# Patient Record
Sex: FEMALE | Race: WHITE
[De-identification: names, ages, dates, MRNs, and addresses within clinical notes are randomized per-mention and may not be internally consistent; named-entity substitution may affect disease eponyms.]

---

## 2018-04-07 ENCOUNTER — HOSPITAL ENCOUNTER (INPATIENT)
Dept: HOSPITAL 97 - ER | Age: 73
LOS: 7 days | Discharge: HOME HEALTH | DRG: 917 | End: 2018-04-14
Attending: FAMILY MEDICINE | Admitting: INTERNAL MEDICINE
Payer: COMMERCIAL

## 2018-04-07 VITALS — BODY MASS INDEX: 28.7 KG/M2

## 2018-04-07 DIAGNOSIS — G47.33: ICD-10-CM

## 2018-04-07 DIAGNOSIS — N39.0: ICD-10-CM

## 2018-04-07 DIAGNOSIS — J96.00: ICD-10-CM

## 2018-04-07 DIAGNOSIS — M54.9: ICD-10-CM

## 2018-04-07 DIAGNOSIS — G92: ICD-10-CM

## 2018-04-07 DIAGNOSIS — F01.50: ICD-10-CM

## 2018-04-07 DIAGNOSIS — G89.4: ICD-10-CM

## 2018-04-07 DIAGNOSIS — R00.0: ICD-10-CM

## 2018-04-07 DIAGNOSIS — E87.1: ICD-10-CM

## 2018-04-07 DIAGNOSIS — J18.9: ICD-10-CM

## 2018-04-07 DIAGNOSIS — I10: ICD-10-CM

## 2018-04-07 DIAGNOSIS — E66.9: ICD-10-CM

## 2018-04-07 DIAGNOSIS — T46.6X1A: ICD-10-CM

## 2018-04-07 DIAGNOSIS — T39.391A: Primary | ICD-10-CM

## 2018-04-07 DIAGNOSIS — I48.2: ICD-10-CM

## 2018-04-07 DIAGNOSIS — R57.9: ICD-10-CM

## 2018-04-07 DIAGNOSIS — E78.5: ICD-10-CM

## 2018-04-07 DIAGNOSIS — F17.200: ICD-10-CM

## 2018-04-07 DIAGNOSIS — E03.9: ICD-10-CM

## 2018-04-07 LAB
ALBUMIN SERPL BCP-MCNC: 3.9 G/DL (ref 3.2–5.5)
ALP SERPL-CCNC: 89 IU/L (ref 42–121)
ALT SERPL W P-5'-P-CCNC: 20 IU/L (ref 10–60)
AST SERPL W P-5'-P-CCNC: 23 IU/L (ref 10–42)
BUN BLD-MCNC: 21 MG/DL (ref 6–20)
CKMB CREATINE KINASE MB: 3.4 NG/ML (ref 0.3–4)
COHGB MFR BLDA: 3.1 % (ref 0–1.5)
GLUCOSE SERPLBLD-MCNC: 128 MG/DL (ref 65–120)
HCT VFR BLD CALC: 39.3 % (ref 36–45)
INR BLD: 0.97
LYMPHOCYTES # SPEC AUTO: 2.3 K/UL (ref 0.7–4.9)
MAGNESIUM SERPL-MCNC: 2 MG/DL (ref 1.8–2.5)
MCH RBC QN AUTO: 28.3 PG (ref 27–35)
MCV RBC: 86.6 FL (ref 80–100)
METHAMPHET UR QL SCN: NEGATIVE
OXYHGB MFR BLDA: 95.5 % (ref 94–97)
PMV BLD: 7.7 FL (ref 7.6–11.3)
POTASSIUM SERPL-SCNC: 4.2 MEQ/L (ref 3.6–5)
RBC # BLD: 4.54 M/UL (ref 3.86–4.86)
SAO2 % BLDA: 99.3 % (ref 92–98.5)
THC SERPL-MCNC: NEGATIVE NG/ML
TSH SERPL DL<=0.05 MIU/L-ACNC: 7.46 UIU/ML (ref 0.34–5.6)
UA COMPLETE W REFLEX CULTURE PNL UR: (no result)

## 2018-04-07 PROCEDURE — 83605 ASSAY OF LACTIC ACID: CPT

## 2018-04-07 PROCEDURE — 82533 TOTAL CORTISOL: CPT

## 2018-04-07 PROCEDURE — 99291 CRITICAL CARE FIRST HOUR: CPT

## 2018-04-07 PROCEDURE — 82550 ASSAY OF CK (CPK): CPT

## 2018-04-07 PROCEDURE — 0T9B70Z DRAINAGE OF BLADDER WITH DRAINAGE DEVICE, VIA NATURAL OR ARTIFICIAL OPENING: ICD-10-PCS

## 2018-04-07 PROCEDURE — 87205 SMEAR GRAM STAIN: CPT

## 2018-04-07 PROCEDURE — 84100 ASSAY OF PHOSPHORUS: CPT

## 2018-04-07 PROCEDURE — 95816 EEG AWAKE AND DROWSY: CPT

## 2018-04-07 PROCEDURE — 83880 ASSAY OF NATRIURETIC PEPTIDE: CPT

## 2018-04-07 PROCEDURE — 96365 THER/PROPH/DIAG IV INF INIT: CPT

## 2018-04-07 PROCEDURE — 36415 COLL VENOUS BLD VENIPUNCTURE: CPT

## 2018-04-07 PROCEDURE — 80307 DRUG TEST PRSMV CHEM ANLYZR: CPT

## 2018-04-07 PROCEDURE — 85610 PROTHROMBIN TIME: CPT

## 2018-04-07 PROCEDURE — 51702 INSERT TEMP BLADDER CATH: CPT

## 2018-04-07 PROCEDURE — 70551 MRI BRAIN STEM W/O DYE: CPT

## 2018-04-07 PROCEDURE — 85730 THROMBOPLASTIN TIME PARTIAL: CPT

## 2018-04-07 PROCEDURE — 74018 RADEX ABDOMEN 1 VIEW: CPT

## 2018-04-07 PROCEDURE — 80076 HEPATIC FUNCTION PANEL: CPT

## 2018-04-07 PROCEDURE — 0BH17EZ INSERTION OF ENDOTRACHEAL AIRWAY INTO TRACHEA, VIA NATURAL OR ARTIFICIAL OPENING: ICD-10-PCS

## 2018-04-07 PROCEDURE — 82553 CREATINE MB FRACTION: CPT

## 2018-04-07 PROCEDURE — 80329 ANALGESICS NON-OPIOID 1 OR 2: CPT

## 2018-04-07 PROCEDURE — 87088 URINE BACTERIA CULTURE: CPT

## 2018-04-07 PROCEDURE — 70450 CT HEAD/BRAIN W/O DYE: CPT

## 2018-04-07 PROCEDURE — 94002 VENT MGMT INPAT INIT DAY: CPT

## 2018-04-07 PROCEDURE — 84145 PROCALCITONIN (PCT): CPT

## 2018-04-07 PROCEDURE — 85025 COMPLETE CBC W/AUTO DIFF WBC: CPT

## 2018-04-07 PROCEDURE — 71045 X-RAY EXAM CHEST 1 VIEW: CPT

## 2018-04-07 PROCEDURE — 84439 ASSAY OF FREE THYROXINE: CPT

## 2018-04-07 PROCEDURE — 87040 BLOOD CULTURE FOR BACTERIA: CPT

## 2018-04-07 PROCEDURE — 80053 COMPREHEN METABOLIC PANEL: CPT

## 2018-04-07 PROCEDURE — 87086 URINE CULTURE/COLONY COUNT: CPT

## 2018-04-07 PROCEDURE — 84132 ASSAY OF SERUM POTASSIUM: CPT

## 2018-04-07 PROCEDURE — 83735 ASSAY OF MAGNESIUM: CPT

## 2018-04-07 PROCEDURE — 84443 ASSAY THYROID STIM HORMONE: CPT

## 2018-04-07 PROCEDURE — 70544 MR ANGIOGRAPHY HEAD W/O DYE: CPT

## 2018-04-07 PROCEDURE — 82962 GLUCOSE BLOOD TEST: CPT

## 2018-04-07 PROCEDURE — 81015 MICROSCOPIC EXAM OF URINE: CPT

## 2018-04-07 PROCEDURE — 84484 ASSAY OF TROPONIN QUANT: CPT

## 2018-04-07 PROCEDURE — 96375 TX/PRO/DX INJ NEW DRUG ADDON: CPT

## 2018-04-07 PROCEDURE — 80048 BASIC METABOLIC PNL TOTAL CA: CPT

## 2018-04-07 PROCEDURE — 81003 URINALYSIS AUTO W/O SCOPE: CPT

## 2018-04-07 PROCEDURE — 71046 X-RAY EXAM CHEST 2 VIEWS: CPT

## 2018-04-07 PROCEDURE — 82805 BLOOD GASES W/O2 SATURATION: CPT

## 2018-04-07 PROCEDURE — 31500 INSERT EMERGENCY AIRWAY: CPT

## 2018-04-07 PROCEDURE — 97163 PT EVAL HIGH COMPLEX 45 MIN: CPT

## 2018-04-07 PROCEDURE — 93005 ELECTROCARDIOGRAM TRACING: CPT

## 2018-04-07 PROCEDURE — 5A1945Z RESPIRATORY VENTILATION, 24-96 CONSECUTIVE HOURS: ICD-10-PCS

## 2018-04-07 PROCEDURE — 94003 VENT MGMT INPAT SUBQ DAY: CPT

## 2018-04-07 RX ADMIN — SODIUM CHLORIDE SCH: 0.9 INJECTION, SOLUTION INTRAVENOUS at 23:14

## 2018-04-07 NOTE — RAD REPORT
EXAM DESCRIPTION:  CT - Head Brain Wo Cont - 4/7/2018 9:05 pm

 

CLINICAL HISTORY:  Altered consciousness.

 

COMPARISON:  04/12/2017

 

TECHNIQUE:  All CT scans are performed using dose optimization technique as appropriate and may inclu
de automated exposure control or mA/KV adjustment according to patient size.

 

FINDINGS:  No intracranial hemorrhage, hydrocephalus or extra-axial fluid collection.Mild generalized
 brain atrophy is present with mild periventricular and deep white matter chronic microvascular ische
john changes.No areas of brain edema or evidence of midline shift.

 

The paranasal sinuses and mastoids are clear. The calvarium is intact.

 

IMPRESSION:  No acute intracranial abnormality.

## 2018-04-07 NOTE — RAD REPORT
EXAM DESCRIPTION:  RAD - Chest Single View - 4/7/2018 8:33 pm

 

CLINICAL HISTORY:  Respiratory failure.

 

COMPARISON:  None.

 

FINDINGS:  Portable technique limits examination quality.

 

The lungs are grossly clear. The tip of the ET tube is above the cy. The heart is normal in size.
 No displaced fractures.

 

IMPRESSION:  Endotracheal intubation.

## 2018-04-07 NOTE — ER
Nurse's Notes                                                                                     

 [facility]                                                                

Name: Olga Mratinez                                                                              

Age: 72 yrs                                                                                       

Sex: Female                                                                                       

: 1945                                                                                   

MRN: [id number]                                                                                   

Arrival Date: 2018                                                                          

Time: 20:07                                                                                       

Account#: [id number]                                                                            

Bed 2                                                                                             

Private MD:                                                                                       

Diagnosis: Altered mental status, unspecified                                                     

                                                                                                  

Presentation:                                                                                     

                                                                                             

20:14 Presenting complaint: EMS states: pt was found sitting in chair slumped over in garage  kl  

      pill fragments noted mouth pt obtunded non responsive agonal respirations. Transition       

      of care: patient was not received from another setting of care. Onset of symptoms was       

      2018. Note PT ARRIVED AT . Care prior to arrival: IV TO RIGHT AC.             

20:14 Method Of Arrival: EMS                                                                    

20:14 Acuity: DIMA 1                                                                             

20:21 Note  % O2 PULSE 88 ETOMIDATE 20MG IV P  120MG SUCCH  PT INTUBATED   kl  

      WITH 7.5 ETT AT % O2 SAT.                                                            

21:10 Note spoke with patient spouse reports at 2 pm spouse appeared confused and slurred     la1 

      speech he said he asked her what she took she responded she took a dilaudid 4mg pill        

      for her back pain he then went into computer room and found her in his garage at around     

      7:00 pm Asked if he thought she would intentionally hurt herself he responded               

      :"absolutely not".                                                                          

                                                                                                  

Triage Assessment:                                                                                

20:05 General: Appears in no apparent distress. obese, well nourished, Behavior is            kl  

      unresponsive. Pain: Unable to use pain scale. Patient is unresponsive. EENT: No             

      deficits noted. Neuro: Level of Consciousness is obtunded, Oriented to none.                

      Cardiovascular: No deficits noted. Respiratory: Respiratory effort is Respiratory           

      pattern is agonal. GI: No deficits noted. : No deficits noted. Derm: No deficits          

      noted.                                                                                      

                                                                                                  

Historical:                                                                                       

- Allergies:                                                                                      

20:25 Sulfa (Sulfonamide Antibiotics);                                                        kl  

- Home Meds:                                                                                      

21:28 alprazolam 0.5 mg Oral Tb24 1 tab once daily [Active]; atorvastatin 40 mg oral tab 1    la1 

      tab once daily [Active]; omeprazole 20 mg Oral TbEC 20 mg [Active]; Flonase 50              

      mcg/actuation Nasal spsn [Active]; levothyroxine 25 mcg tab once daily [Active];            

      diclofenac sodium 75 mg oral TbEC 1 tab 2 times per day [Active]; Vitamin D Oral 2000       

      unit twice a day [Active]; baclofen 10 mg Oral tab 1 tab twice daily [Active]; Dilaudid     

      4 mg oral tab [Active]; amiodarone 200 mg Oral tab once daily [Active]; Eliquis 5 mg        

      oral tab 2 times per day [Active]; Bupivicaine pain pump [Active];                          

- PMHx:                                                                                           

21:30 cardiiac arrythmia; High Cholesterol; Chronic pain; Dementia; Hypothyroidism;           la1 

                                                                                                  

- Immunization history:: Adult Immunizations unknown.                                             

- Social history:: Smoking status: unknown.                                                       

- Family history:: not pertinent.                                                                 

                                                                                                  

                                                                                                  

Screenin:15 Abuse screen: Denies injuries from another. Nutritional screening: No deficits noted.   la1 

      Tuberculosis screening: No symptoms or risk factors identified. Fall Risk None              

      identified.                                                                                 

                                                                                                  

Assessment:                                                                                       

20:20 Reassessment: Pt has a white powdery substance around lips and mouth. General: Appears  la1 

      obese, Behavior is unresponsive. Neuro: Level of Consciousness is unresponsive. Neuro:      

      Pupils are PERRLA. Cardiovascular: Heart tones S1 S2 present Capillary refill < 3           

      seconds Patient's skin is warm and dry. Rhythm is sinus bradycardia. Respiratory:           

      Airway via oral intubation Trachea midline Breath sounds are clear bilaterally. GI:         

      Abdomen is obese, Bowel sounds present X 4 quads. Abd is soft X 4 quads. : No signs       

      and/or symptoms were reported regarding the genitourinary system.                           

21:45 Reassessment: Patient appears in no apparent distress at this time. No changes from     la1 

      previously documented assessment. pt intubated, tolerating tube. titrating propofol as      

      ordered. Biar hugger initiated as pt was hypothermic. Pt resting in stretcher,       

      at bedside.                                                                                 

22:00 Reassessment: Patient appears in no apparent distress at this time. No changes from     aa1 

      previously documented assessment. Report received from Matthias Mora RN. Pt awaiting ICU      

      bed. Dr. Serrato at bedside conversing with .                                       

23:10 Reassessment: Report given to BENIGNO Bradley in ICU.                                       aa1 

                                                                                                  

Vital Signs:                                                                                      

20:14  / 75; Pulse 81; Resp 16; Pulse Ox 100% ;                                         kl  

21:00 Weight 100 kg (R);                                                                      la1 

21:22  / 82; Pulse 47; Resp 14; Temp 93.2(R); Pulse Ox 100% on ETT vent;                la1 

21:44 BP 89 / 50; Pulse 47; Resp 15; Pulse Ox 100% on ETT vent;                               la1 

22:07  / 56; Pulse 47; Resp 18; Temp 93.5(R); Pulse Ox 99% on ETT vent;                 aa1 

23:01 BP 90 / 48; Pulse 51; Resp 16; Temp 94.5(R); Pulse Ox 98% on ETT vent;                  aa1 

                                                                                                  

ED Course:                                                                                        

20:07 Patient arrived in ED.                                                                  em1 

20:11 Beverly Kapoor MD is Attending Physician.                                           ma2 

20:15 Assisted provider with intubation using 7.5 mm ETT via oral route. Set up intubation    la1 

      tray. Intubated by Beverly Kapoor MD Placement verified by CXR, CO2 detector w/ +         

      color change, auscultating bilateral breath sounds, Patient tolerated well.                 

20:19 Triage completed.                                                                       kl  

20:26 Arm band placed on right wrist.                                                         kl  

20:29 X-ray completed. Portable x-ray completed in exam room.                                 la2 

20:33 XRAY Chest (1 view) In Process Unspecified.                                             EDMS

20:52 Matthias Mora, RN is Primary Nurse.                                                       la1 

21:00 Delgadillo cath inserted, using sterile technique, 16 Fr., by ED Tech, balloon inflated, to  la1 

      gravity drainage, urine specimen collected.                                                 

21:00 Inserted saline lock: 18 gauge in right antecubital area, using aseptic technique.      la1 

      Blood collected.                                                                            

21:00 Maintain EMS IV. Dressing intact. Good blood return noted. Site clean \T\ dry. Gauge \T\    la
1

      site: 18g RAC.                                                                              

21:01 NGT: inserted 14 Fr. other OG verified placement of air over stomach, verified return   la1 

      of gastric contents, to intermittent suction. Returned gastric contents. Patient            

      tolerated well.                                                                             

21:02 Placed in gown. Bed in low position. Call light in reach. Cardiac monitor on. Pulse ox  la1 

      on. NIBP on.                                                                                

21:05 CT Head Brain wo Cont In Process Unspecified.                                           EDMS

21:09 CT completed. Patient tolerated procedure well. Patient moved back from CT.             bq  

21:28 Talha Noel MD is Hospitalizing Provider.                                          ma2 

21:29 Bear hugger placed on patient.                                                          cr4 

22:10 EKG done, by ED staff, reviewed by Talha Noel MD.                                   aa1 

23:15 Patient admitted, IV remains in place.                                                  aa1 

                                                                                                  

Administered Medications:                                                                         

20:12 Drug: Etomidate 20 mg Route: IVP; Site: right antecubital;                              la1 

21:42 Follow up: Response: Patient is sedated                                                 la1 

20:13 Drug: Succinylcholine 120 mg Route: IVP; Site: right antecubital;                       la1 

21:42 Follow up: Response: Patient is sedated                                                 la1 

20:15 Drug: NS 0.9% 1000 ml Route: IV; Rate: 1 bolus; Site: right antecubital;                la1 

21:43 Follow up: IV Status: Completed infusion                                                la1 

20:15 Drug: Propofol 5 mcg/kg/min Route: IV; Rate: calculated rate; Site: right antecubital;  la1 

21:43 Follow up: IV Status: Infusion continued upon admission                                 la1 

20:53 Not Given (Other Intervention Used): og tube 1 amp G-Tube continuous                    la1 

                                                                                                  

                                                                                                  

Point of Care Testing:                                                                            

      Blood Glucose:                                                                              

20:20 Blood Glucose: 125 mg/dL;                                                               ao  

      Ranges:                                                                                     

                                                                                                  

Outcome:                                                                                          

21:29 Decision to Hospitalize by Provider.                                                    ma2 

23:15 Admitted to ICU accompanied by nurse, accompanied by tech, via stretcher, room 6, with  aa1 

      oxygen, on monitor, with chart, Report called to  BENIGNO Bradley                               

23:15 Condition: stable                                                                           

23:15 Discharge instructions given to significant other, Instructed on the need for admit,        

      Demonstrated understanding of instructions.                                                 

23:15 Patient left the ED.                                                                    aa1 

                                                                                                  

Signatures:                                                                                       

Dispatcher [employer]                           EDMS                                                 

Justina Garvey RN RN kl Kern, Alissa, RN                        RN   [email or web address]                                                  

Olga Johansen Claudia RN                       RN   Gabriele Sosa                               em1                                                  

Matthias Mora RN RN   la1                                                  

Shamar Lucero RN                         RN   Shilpa Valdez                               la2                                                  

Beverly Kapoor MD MD   ma2                                                  

                                                                                                  

Corrections: (The following items were deleted from the chart)                                    

23:41 23:40 Patient left the ED. aa1                                                          aa1 

                                                                                                  

**************************************************************************************************

## 2018-04-07 NOTE — EDPHYS
Physician Documentation                                                                           

 [facility] Emergency Department                                                                

Name: Olga Martinez                                                                              

Age: 72 yrs                                                                                       

Sex: Female                                                                                       

: 1945                                                                                   

MRN: [id number]                                                                                   

Arrival Date: 2018                                                                          

Time: 20:07                                                                                       

Account#: [id number]                                                                            

Bed 2                                                                                             

Private MD:                                                                                       

ED Physician Beverly Kapoor                                                                    

HPI:                                                                                              

                                                                                             

20:18 This 72 yrs old  Female presents to ER via [facility] with complaints of AMS.  ma2 

20:18 The patient presents with decreased mental status. Onset: The symptoms/episode          ma2 

      began/occurred gradually, just prior to arrival, 1 day(s) ago. Associated signs and         

      symptoms: Pertinent negatives: abdominal pain. Current symptoms: In the emergency           

      department the patient's symptoms have improved, moderately. Patient's baseline:            

      Neuro:. The patient has not experienced similar symptoms in the past. bib ems found in      

      the garage possibly overdosed diclofenac sodium overdoase, found with empty bottle she      

      is intubated in ER for airway protectin .                                                   

21:17 patient is also on amiodaron for hx of SVT, she is also on Levothyroxine, recently      ma2 

      refilled her dilauded, and started taking it last night for back pain also on               

      alprazolam for anxiety.                                                                     

                                                                                                  

Historical:                                                                                       

- Allergies:                                                                                      

20:25 Sulfa (Sulfonamide Antibiotics);                                                        kl  

- Home Meds:                                                                                      

21:28 alprazolam 0.5 mg Oral Tb24 1 tab once daily [Active]; atorvastatin 40 mg oral tab 1    la1 

      tab once daily [Active]; omeprazole 20 mg Oral TbEC 20 mg [Active]; Flonase 50              

      mcg/actuation Nasal spsn [Active]; levothyroxine 25 mcg tab once daily [Active];            

      diclofenac sodium 75 mg oral TbEC 1 tab 2 times per day [Active]; Vitamin D Oral 2000       

      unit twice a day [Active]; baclofen 10 mg Oral tab 1 tab twice daily [Active]; Dilaudid     

      4 mg oral tab [Active]; amiodarone 200 mg Oral tab once daily [Active]; Eliquis 5 mg        

      oral tab 2 times per day [Active]; Bupivicaine pain pump [Active];                          

- PMHx:                                                                                           

21:30 cardiiac arrythmia; High Cholesterol; Chronic pain; Dementia; Hypothyroidism;           la1 

                                                                                                  

- Immunization history:: Adult Immunizations unknown.                                             

- Social history:: Smoking status: unknown.                                                       

- Family history:: not pertinent.                                                                 

                                                                                                  

                                                                                                  

ROS:                                                                                              

20:30 Unable to obtain ROS due to altered mental status.                                      ma2 

21:17 Eyes: Positive for 2 mm billat.                                                         ma2 

                                                                                                  

Exam:                                                                                             

20:30 Head/Face:  Normocephalic, atraumatic. Eyes:  Pupils equal round and reactive to light, ma2 

      extra-ocular motions intact.  Lids and lashes normal.  Conjunctiva and sclera are           

      non-icteric and not injected.  Cornea within normal limits.  Periorbital areas with no      

      swelling, redness, or edema. ENT:  Nares patent. No nasal discharge, no septal              

      abnormalities noted.  Tympanic membranes are normal and external auditory canals are        

      clear.  Oropharynx with no redness, swelling, or masses, exudates, or evidence of           

      obstruction, uvula midline.  Mucous membranes moist.                                        

20:30 Respiratory:  Lungs have equal breath sounds bilaterally, clear to auscultation and         

      percussion.  No rales, rhonchi or wheezes noted.  No increased work of breathing, no        

      retractions or nasal flaring. Abdomen/GI:  Soft, non-tender, with normal bowel sounds.      

      No distension or tympany.  No guarding or rebound.  No evidence of tenderness               

      throughout. Back:  No spinal tenderness.  No costovertebral tenderness.  Full range of      

      motion. Female :  Normal external genitalia. MS/ Extremity:  Pulses equal, no             

      cyanosis.  Neurovascular intact.  Full, normal range of motion.                             

20:30 Constitutional: The patient appears altered not responding to painful stimulus no signs     

      of trauma                                                                                   

                                                                                                  

Vital Signs:                                                                                      

20:14  / 75; Pulse 81; Resp 16; Pulse Ox 100% ;                                         kl  

21:00 Weight 100 kg (R);                                                                      la1 

21:22  / 82; Pulse 47; Resp 14; Temp 93.2(R); Pulse Ox 100% on ETT vent;                la1 

21:44 BP 89 / 50; Pulse 47; Resp 15; Pulse Ox 100% on ETT vent;                               la1 

22:07  / 56; Pulse 47; Resp 18; Temp 93.5(R); Pulse Ox 99% on ETT vent;                 aa1 

23:01 BP 90 / 48; Pulse 51; Resp 16; Temp 94.5(R); Pulse Ox 98% on ETT vent;                  aa1 

                                                                                                  

Procedures:                                                                                       

21:26 Intubation: Ventilated with 100% NRB prior to procedure. Intubated orally using # 4     ma2 

      Ugalde blade with 7.5 mm ETT. was successful on first attempt. Ventilated with              

      ventilator. Cricoid pressure applied during procedure. Tube secured with tape with ETT      

      nathan Placement verified by CXR, CO2 detector with (+) color change, auscultating          

      bilateral breath sounds, Patient tolerated well.                                            

                                                                                                  

MDM:                                                                                              

20:11 Patient medically screened.                                                             ma2 

20:30 Differential Diagnosis: CVA, electrolyte abnormality, alcohol intoxication,             ma2 

      hypoglycemia, overdose, pneumonia, UTI, volume depletion.                                   

21:20 Differential Diagnosis: likley dilauded overdose vs alprazolam vs NSAID, CTH wnl and    ma2 

      CXR unremarked . Data reviewed: vital signs, nurses notes, I have discussed the             

      patient's presentation/case with the attending Emergency Department Physician; and as a     

      result, I will.                                                                             

21:26 Admission orders: after a detailed discussion of the patient's condition and case, the  [facility] 

      admit orders are written by me.                                                             

21:26 Admission orders: after a detailed discussion of the patient's condition and case, the  [facility] 

      admit orders are written by me. ED course: discussed with poison center recommends          

      supportive care .                                                                           

                                                                                                  

                                                                                             

20:15 Order name: Basic Metabolic Panel                                                       [facility] 

                                                                                             

20:15 Order name: BNP; Complete Time: 21:25                                                   [facility] 

                                                                                             

20:15 Order name: CBC with Diff; Complete Time: 21:19                                         [facility] 

                                                                                             

20:15 Order name: Ckmb                                                                        [facility] 

                                                                                             

20:15 Order name: CPK                                                                         [facility] 

                                                                                             

20:15 Order name: LFT's                                                                       [facility] 

                                                                                             

20:15 Order name: Magnesium                                                                   [facility] 

                                                                                             

20:15 Order name: PT-INR; Complete Time: 21:19                                                [facility] 

                                                                                             

20:15 Order name: Ptt, Activated; Complete Time: 21:19                                        [facility] 

                                                                                             

20:15 Order name: Troponin (emerg Dept Use Only); Complete Time: 21:19                        [facility] 

                                                                                             

20:17 Order name: UDS; Complete Time: 21:30                                                   [facility] 

                                                                                             

20:17 Order name: ABG: venous only please ; Complete Time: 21:25                              [facility] 

                                                                                             

20:15 Order name: XRAY Chest (1 view); Complete Time: 21:19                                   [facility] 

                                                                                             

20:15 Order name: EKG; Complete Time: 20:15                                                   ma2 

                                                                                             

20:15 Order name: Cardiac monitoring; Complete Time: 20:53                                    ma2 

                                                                                             

20:15 Order name: EKG - Nurse/Tech; Complete Time: 20:53                                      ma2 

                                                                                             

20:15 Order name: CT Head Brain wo Cont; Complete Time: 21:19                                 ma2 

                                                                                             

20:53 Order name: Acetaminophen Level                                                         EDMS

                                                                                             

21:09 Order name: Urine Dipstick--Ancillary (enter results); Complete Time: 21:36             em1 

                                                                                             

21:15 Order name: Urine Microscopic Only; Complete Time: 21:25                                EDMS

                                                                                             

21:20 Order name: TSH                                                                         ma2 

                                                                                             

21:20 Order name: T4 Free                                                                     ma2 

                                                                                             

22:03 Order name: EKG; Complete Time: 22:03                                                   cr4 

                                                                                             

20:15 Order name: IV Saline Lock; Complete Time: 20:53                                        ma2 

                                                                                             

20:15 Order name: Labs collected and sent; Complete Time: 20:53                               ma2 

                                                                                             

20:15 Order name: O2 Per Protocol; Complete Time: 20:54                                       ma2 

                                                                                             

20:15 Order name: O2 Sat Monitoring; Complete Time: 20:54                                     ma2 

                                                                                             

20:15 Order name: Urine Dipstick-Ancillary (obtain specimen); Complete Time: 20:54            ma2 

                                                                                             

20:15 Order name: Delgadillo; Complete Time: 20:53                                                 ma2 

                                                                                             

20:53 Order name: Nasogastric Tube; Complete Time: 20:54                                      la1 

                                                                                             

21:30 Order name: Liv Styles; Complete Time: 21:41                                           ma2 

                                                                                             

22:03 Order name: EKG - Nurse/Tech; Complete Time: 22:17                                      cr4 

                                                                                                  

Administered Medications:                                                                         

20:12 Drug: Etomidate 20 mg Route: IVP; Site: right antecubital;                              la1 

21:42 Follow up: Response: Patient is sedated                                                 la1 

20:13 Drug: Succinylcholine 120 mg Route: IVP; Site: right antecubital;                       la1 

21:42 Follow up: Response: Patient is sedated                                                 la1 

20:15 Drug: NS 0.9% 1000 ml Route: IV; Rate: 1 bolus; Site: right antecubital;                la1 

21:43 Follow up: IV Status: Completed infusion                                                la1 

20:15 Drug: Propofol 5 mcg/kg/min Route: IV; Rate: calculated rate; Site: right antecubital;  la1 

21:43 Follow up: IV Status: Infusion continued upon admission                                 la1 

20:53 Not Given (Other Intervention Used): og tube 1 amp G-Tube continuous                    la1 

                                                                                                  

                                                                                                  

Point of Care Testing:                                                                            

      Blood Glucose:                                                                              

20:20 Blood Glucose: 125 mg/dL;                                                               ao  

      Ranges:                                                                                     

      Critical Glucose Levels:Adult <50 mg/dl or >400 mg/dl  <40 mg/dl or >180 mg/dl       

Disposition:                                                                                      

21:26 Critical Care:.                                                                         ma2 

                                                                                                  

Disposition:                                                                                      

18 21:29 Hospitalization ordered by Talha Noel for Inpatient Admission. Preliminary    

  diagnosis is Altered mental status, unspecified.                                                

- Bed requested for Intensive Care Unit.                                                          

- Status is Inpatient Admission.                                                              aa1 

- Condition is Critical.                                                                          

- Problem is new.                                                                                 

- Symptoms are unchanged.                                                                         

UTI on Admission? No                                                                              

                                                                                                  

                                                                                                  

                                                                                                  

Critical care time excluding procedures:                                                          

21:26 Critical care time: Bedside Care: 30 minutes, Consultation: 10 minutes, Family          ma2 

      Intervention: 5 minutes. Total time: 45 minutes                                             

                                                                                                  

Signatures:                                                                                       

Dispatcher [employer]                           EDJustina Obando RN                     Carrol Wan RN                        RN   aa1                                                  

Veronica Osborn RN                       BENIGNO   cr4                                                  

Matthias Mora RN RN la1                                                  

Beverly Kapoor MD MD   ma2                                                  

                                                                                                  

Corrections: (The following items were deleted from the chart)                                    

20:53 20:17 ACETAMINOPHEN+C.LAB.BRZ ordered. EDMS                                             EDMS

21:15 20:15 URINALYSIS+U.LAB.BRZ ordered. EDMS                                                EDMS

                                                                                                  

**************************************************************************************************

## 2018-04-07 NOTE — P.HP
Certification for Inpatient


Patient admitted to: Inpatient


With expected LOS: >2 Midnights


Practitioner: I am a practitioner with admitting privileges, knowledge of 

patient current condition, hospital course, and medical plan of care.


Services: Services provided to patient in accordance with Admission 

requirements found in Title 42 Section 412.3 of the Code of Federal Regulations





Patient History


Date of Service: 04/07/18


Reason for admission: drug overdose


History of Present Illness: 





Ms Martinez is a 72 years old woman with history of chronic pain syndrome, 

hypothyroidism, SVT, who this afternoon was found by her , unresponsive 

in the garage. She had rest of several pills in her mouth. She had a bottle of 

Diclofenac and other of Atorvastatin around her when she was found. EMT were 

called and the patient was transferred to ED. In ER the patient remain 

unresponsive and she was intubated for airway protection. Her  states 

that she had no intention to harm herself. However, he also said that she get 

some confused when she takes her opioids medication for chronic pain. No 

history of fever, chills, chest pain, or SOB prior this episode.


Allergies





Sulfa (Sulfonamide Antibiotics) [Sulfa(Sulfonamide Antibiotics)] Allergy (

Intermediate, Verified 05/01/12 13:03)


 Rash








- Past Medical/Surgical History


-: SVT


-: chronic back pain


-: dyslipidemia


Past Surgical History: Reviewed- Non-Contributory





- Family History


Family History: Reviewed- Non-Contributory





- Social History


Smoking Status: Current every day smoker


Alcohol use: No


CD- Drugs: No


Caffeine use: Yes


Place of Residence: Home





Review of Systems


is unable to be obtained (patient is intubated)





Physical Examination





- Physical Exam


General: Unresponsive


HEENT: Atraumatic, PERRLA, Other (dry mucous mambr.), Sclerae nonicteric


Neck: Supple, 2+ carotid pulse no bruit, No LAD, Without JVD or thyroid 

abnormality


Respiratory: Clear to auscultation bilaterally, Normal air movement


Cardiovascular: Regular rate/rhythm (sinus bradycardia), Normal S1 S2


Gastrointestinal: Normal bowel sounds, No tenderness


Musculoskeletal: No clubbing, No swelling, No tenderness


Neurological: Other (she is sedated with propofol for intubation)


Urinary: Delgadillo catheter





- Studies


Laboratory Data (last 24 hrs)





04/07/18 20:40: PT 11.4, INR 0.97, APTT 19.1 L


04/07/18 20:40: WBC 12.3 H, Hgb 12.8, Hct 39.3, Plt Count 201


04/07/18 20:40: B-Natriuretic Peptide 73


04/07/18 20:40: Sodium 132 L, Potassium 4.2, BUN 21 H, Creatinine 0.66, Glucose 

128 H, Magnesium 2.0, Total Bilirubin 0.9, AST 23, ALT 20, Alkaline Phosphatase 

89








Assessment and Plan





- Problems (Diagnosis)


(1) Acute encephalopathy


Current Visit: Yes   Status: Acute   





(2) Drug overdose


Current Visit: Yes   Status: Acute   


Qualifiers: 


   Encounter type: initial encounter   Injury intent: undetermined intent   

Qualified Code(s): T50.904A - Poisoning by unspecified drugs, medicaments and 

biological substances, undetermined, initial encounter   





(3) Chronic pain syndrome


Current Visit: Yes   Status: Acute   





- Plan





Ms Martinez will be admitted to ICU intubated, due to acute encephalopathy in 

context of multiple drugs overdose. She is positive for opioids in toxycology 

screen, also suspicion for diclofenac, atorvastatin, and possible 

benzodiazepines. According to her  she was not wants to commit suicide. 

Will transfer the patient to ICU, will continue ventilator support. BP and HR 

on the lower side. Continue close monitoring of vital signs.





- Advance Directives


Does patient have a Living Will: No


Does patient have a Durable POA for Healthcare: No





- Code Status/Comfort Care


Code Status Assessed: Yes


Code Status: Full Code

## 2018-04-08 LAB
ALBUMIN SERPL BCP-MCNC: 3.3 G/DL (ref 3.2–5.5)
ALP SERPL-CCNC: 137 IU/L (ref 42–121)
ALT SERPL W P-5'-P-CCNC: 514 IU/L (ref 10–60)
AST SERPL W P-5'-P-CCNC: 691 IU/L (ref 10–42)
BUN BLD-MCNC: 21 MG/DL (ref 6–20)
COHGB MFR BLDA: 2.9 % (ref 0–1.5)
GLUCOSE SERPLBLD-MCNC: 97 MG/DL (ref 65–120)
HCT VFR BLD CALC: 38.5 % (ref 36–45)
LYMPHOCYTES # SPEC AUTO: 1.9 K/UL (ref 0.7–4.9)
MAGNESIUM SERPL-MCNC: 1.7 MG/DL (ref 1.8–2.5)
MCH RBC QN AUTO: 28.4 PG (ref 27–35)
MCV RBC: 85.4 FL (ref 80–100)
OXYHGB MFR BLDA: 91.5 % (ref 94–97)
PMV BLD: 8 FL (ref 7.6–11.3)
POTASSIUM SERPL-SCNC: 3.8 MEQ/L (ref 3.6–5)
RBC # BLD: 4.51 M/UL (ref 3.86–4.86)
SAO2 % BLDA: 96.2 % (ref 92–98.5)

## 2018-04-08 PROCEDURE — 02HV33Z INSERTION OF INFUSION DEVICE INTO SUPERIOR VENA CAVA, PERCUTANEOUS APPROACH: ICD-10-PCS

## 2018-04-08 RX ADMIN — LEVOTHYROXINE SODIUM ANHYDROUS SCH: 100 INJECTION, POWDER, LYOPHILIZED, FOR SOLUTION INTRAVENOUS at 06:00

## 2018-04-08 RX ADMIN — SODIUM CHLORIDE SCH MLS: 0.9 INJECTION, SOLUTION INTRAVENOUS at 20:55

## 2018-04-08 RX ADMIN — DEXAMETHASONE SODIUM PHOSPHATE SCH MG: 4 INJECTION, SOLUTION INTRAMUSCULAR; INTRAVENOUS at 16:03

## 2018-04-08 RX ADMIN — Medication SCH ML: at 20:22

## 2018-04-08 RX ADMIN — SODIUM CHLORIDE PRN MLS: 0.9 INJECTION, SOLUTION INTRAVENOUS at 15:01

## 2018-04-08 RX ADMIN — APIXABAN SCH MG: 5 TABLET, FILM COATED ORAL at 20:20

## 2018-04-08 RX ADMIN — FENTANYL CITRATE PRN MCG: 50 INJECTION, SOLUTION INTRAMUSCULAR; INTRAVENOUS at 21:45

## 2018-04-08 RX ADMIN — DICLOFENAC SODIUM SCH: 75 TABLET, DELAYED RELEASE ORAL at 20:23

## 2018-04-08 RX ADMIN — NICARDIPINE HYDROCHLORIDE PRN MLS: 25 INJECTION INTRAVENOUS at 10:15

## 2018-04-08 RX ADMIN — NICARDIPINE HYDROCHLORIDE PRN MLS: 25 INJECTION INTRAVENOUS at 10:35

## 2018-04-08 RX ADMIN — NICARDIPINE HYDROCHLORIDE PRN MLS: 25 INJECTION INTRAVENOUS at 20:34

## 2018-04-08 RX ADMIN — ATORVASTATIN CALCIUM SCH MG: 40 TABLET, FILM COATED ORAL at 20:21

## 2018-04-08 RX ADMIN — SODIUM CHLORIDE SCH MLS: 0.9 INJECTION, SOLUTION INTRAVENOUS at 04:44

## 2018-04-08 RX ADMIN — CEFTRIAXONE SCH MLS: 1 INJECTION, SOLUTION INTRAVENOUS at 15:21

## 2018-04-08 RX ADMIN — NICARDIPINE HYDROCHLORIDE PRN MLS: 25 INJECTION INTRAVENOUS at 14:25

## 2018-04-08 RX ADMIN — DICLOFENAC SODIUM SCH MG: 75 TABLET, DELAYED RELEASE ORAL at 20:21

## 2018-04-08 RX ADMIN — Medication SCH: at 08:18

## 2018-04-08 RX ADMIN — SODIUM CHLORIDE PRN MLS: 0.9 INJECTION, SOLUTION INTRAVENOUS at 08:54

## 2018-04-08 RX ADMIN — NICARDIPINE HYDROCHLORIDE PRN MLS: 25 INJECTION INTRAVENOUS at 17:43

## 2018-04-08 RX ADMIN — Medication SCH ML: at 23:18

## 2018-04-08 RX ADMIN — FENTANYL CITRATE PRN MCG: 50 INJECTION, SOLUTION INTRAMUSCULAR; INTRAVENOUS at 15:39

## 2018-04-08 RX ADMIN — Medication SCH ML: at 08:17

## 2018-04-08 RX ADMIN — CHOLECALCIFEROL TAB 25 MCG (1000 UNIT) SCH UNIT: 25 TAB at 20:20

## 2018-04-08 RX ADMIN — NICARDIPINE HYDROCHLORIDE PRN MLS: 25 INJECTION INTRAVENOUS at 13:10

## 2018-04-08 RX ADMIN — FENTANYL CITRATE PRN MCG: 50 INJECTION, SOLUTION INTRAMUSCULAR; INTRAVENOUS at 10:58

## 2018-04-08 NOTE — EKG
Test Date:    2018-04-07               Test Time:    22:11:43

Technician:   MAGDALENA                                     

                                                     

MEASUREMENT RESULTS:                                       

Intervals:                                           

Rate:         47                                     

AR:           164                                    

QRSD:         92                                     

QT:           520                                    

QTc:          460                                    

Axis:                                                

P:            97                                     

AR:           164                                    

QRS:          58                                     

T:            59                                     

                                                     

INTERPRETIVE STATEMENTS:                                       

                                                     

Sinus bradycardia

Abnormal ECG

Compared to ECG 05/01/2001 08:10:00

Sinus rhythm no longer present

T-wave abnormality no longer present



Electronically Signed On 04-08-18 10:20:23 CDT by Demetrius Gomez

## 2018-04-08 NOTE — P.PN
Subjective


Date of Service: 04/08/18


Chief Complaint: drug overdose





Patient's condition is stable she is unresponsive except to very deep pain on a 

ventilator according to the  there is a history of depression the 

probably took an overdose





Review of Systems


is unable to be obtained





Physical Examination





- Vital Signs


Temperature: 98.1 F


Blood Pressure: 95/45


Pulse: 48


Respirations: 12


Pulse Ox (%): 100





- Physical Exam


General: Unresponsive


Respiratory: Clear to auscultation bilaterally


Cardiovascular: No edema, Regular rate/rhythm


Gastrointestinal: Normal bowel sounds, Soft and benign





- Studies


Laboratory Data (last 24 hrs)





04/07/18 20:40: PT 11.4, INR 0.97, APTT 19.1 L


04/07/18 20:40: WBC 12.3 H, Hgb 12.8, Hct 39.3, Plt Count 201


04/07/18 20:40: B-Natriuretic Peptide 73


04/07/18 20:40: Sodium 132 L, Potassium 4.2, BUN 21 H, Creatinine 0.66, Glucose 

128 H, Phosphorus Cancelled, Magnesium 2.0, Total Bilirubin 0.9, AST 23, ALT 20

, Alkaline Phosphatase 89








Assessment & Plan





- Problems (Diagnosis)


(1) Drug overdose


Current Visit: Yes   Status: Acute   


Plan: 


Patient is 72 years of age with a history of depression and with the 

possibility of a drug overdose patient was hypercapnic hypoxic chest x-rays 

clear toxicology screen is positive patient does take benzodiazepines and 

narcotics


Qualifiers: 


   Encounter type: initial encounter   Injury intent: undetermined intent   

Qualified Code(s): T50.904A - Poisoning by unspecified drugs, medicaments and 

biological substances, undetermined, initial encounter   





(2) Shock


Current Visit: Yes   Status: Acute   


Plan: 


Patient hypotensive fluid bolus maximum 2 L wean off propofol oxygenation 

satisfactory stat cortisol level stress doses of steroids of started on 

Decadron and cultures Rocephin








(3) Hypothyroid


Current Visit: Yes   Status: Acute   


Plan: 


Increase the dose of Synthroid

## 2018-04-08 NOTE — EKG
Test Date:    2018-04-07               Test Time:    20:40:53

Technician:   MAGDALENA                                     

                                                     

MEASUREMENT RESULTS:                                       

Intervals:                                           

Rate:         57                                     

FL:           168                                    

QRSD:         102                                    

QT:           494                                    

QTc:          480                                    

Axis:                                                

P:            80                                     

FL:           168                                    

QRS:          63                                     

T:            61                                     

                                                     

INTERPRETIVE STATEMENTS:                                       

                                                     

Sinus bradycardia

Non specific T abnormality

Abnormal ECG

Compared to ECG 05/01/2001 08:10:00

Sinus rhythm no longer present



Electronically Signed On 04-08-18 10:21:10 CDT by Demetrius Gomez

## 2018-04-09 LAB
ALBUMIN SERPL BCP-MCNC: 3.2 G/DL (ref 3.2–5.5)
ALP SERPL-CCNC: 127 IU/L (ref 42–121)
ALT SERPL W P-5'-P-CCNC: 305 IU/L (ref 10–60)
AST SERPL W P-5'-P-CCNC: 202 IU/L (ref 10–42)
BUN BLD-MCNC: 14 MG/DL (ref 6–20)
BURR CELLS BLD QL SMEAR: (no result)
COHGB MFR BLDA: 1.1 % (ref 0–1.5)
GLUCOSE SERPLBLD-MCNC: 169 MG/DL (ref 65–120)
HCT VFR BLD CALC: 39.7 % (ref 36–45)
LYMPHOCYTES # SPEC AUTO: 0.4 K/UL (ref 0.7–4.9)
MAGNESIUM SERPL-MCNC: 1.9 MG/DL (ref 1.8–2.5)
MCH RBC QN AUTO: 27.6 PG (ref 27–35)
MCV RBC: 85.7 FL (ref 80–100)
MORPHOLOGY BLD-IMP: (no result)
OXYHGB MFR BLDA: 95.5 % (ref 94–97)
PMV BLD: 8.5 FL (ref 7.6–11.3)
POIKILOCYTOSIS BLD QL SMEAR: (no result)
POTASSIUM SERPL-SCNC: 4.4 MEQ/L (ref 3.6–5)
RBC # BLD: 4.63 M/UL (ref 3.86–4.86)
SAO2 % BLDA: 98.6 % (ref 92–98.5)
UA COMPLETE W REFLEX CULTURE PNL UR: (no result)
UA DIPSTICK W REFLEX MICRO PNL UR: (no result)

## 2018-04-09 RX ADMIN — Medication SCH ML: at 21:05

## 2018-04-09 RX ADMIN — HYDROMORPHONE HYDROCHLORIDE PRN MG: 2 INJECTION INTRAMUSCULAR; INTRAVENOUS; SUBCUTANEOUS at 10:01

## 2018-04-09 RX ADMIN — AMIODARONE HYDROCHLORIDE SCH MG: 200 TABLET ORAL at 13:42

## 2018-04-09 RX ADMIN — FENTANYL CITRATE PRN MCG: 50 INJECTION, SOLUTION INTRAMUSCULAR; INTRAVENOUS at 09:15

## 2018-04-09 RX ADMIN — APIXABAN SCH MG: 5 TABLET, FILM COATED ORAL at 13:42

## 2018-04-09 RX ADMIN — ZIPRASIDONE MESYLATE PRN MG: 20 INJECTION, POWDER, LYOPHILIZED, FOR SOLUTION INTRAMUSCULAR at 12:10

## 2018-04-09 RX ADMIN — Medication SCH ML: at 10:16

## 2018-04-09 RX ADMIN — CHOLECALCIFEROL TAB 25 MCG (1000 UNIT) SCH UNIT: 25 TAB at 21:05

## 2018-04-09 RX ADMIN — Medication SCH ML: at 10:15

## 2018-04-09 RX ADMIN — DICLOFENAC SODIUM SCH: 75 TABLET, DELAYED RELEASE ORAL at 21:00

## 2018-04-09 RX ADMIN — APIXABAN SCH MG: 5 TABLET, FILM COATED ORAL at 21:03

## 2018-04-09 RX ADMIN — CHOLECALCIFEROL TAB 25 MCG (1000 UNIT) SCH UNIT: 25 TAB at 13:41

## 2018-04-09 RX ADMIN — HYDROMORPHONE HYDROCHLORIDE PRN MG: 4 TABLET ORAL at 13:41

## 2018-04-09 RX ADMIN — SODIUM CHLORIDE SCH MLS: 0.9 INJECTION, SOLUTION INTRAVENOUS at 05:55

## 2018-04-09 RX ADMIN — SODIUM CHLORIDE SCH MLS: 0.9 INJECTION, SOLUTION INTRAVENOUS at 16:27

## 2018-04-09 RX ADMIN — HALOPERIDOL LACTATE PRN MG: 5 INJECTION, SOLUTION INTRAMUSCULAR at 11:09

## 2018-04-09 RX ADMIN — HALOPERIDOL LACTATE PRN MG: 5 INJECTION, SOLUTION INTRAMUSCULAR at 05:27

## 2018-04-09 RX ADMIN — ALPRAZOLAM SCH MG: 0.5 TABLET ORAL at 13:42

## 2018-04-09 RX ADMIN — DICLOFENAC SODIUM SCH: 75 TABLET, DELAYED RELEASE ORAL at 09:00

## 2018-04-09 RX ADMIN — ATORVASTATIN CALCIUM SCH MG: 40 TABLET, FILM COATED ORAL at 21:03

## 2018-04-09 RX ADMIN — DEXAMETHASONE SODIUM PHOSPHATE SCH MG: 4 INJECTION, SOLUTION INTRAMUSCULAR; INTRAVENOUS at 00:54

## 2018-04-09 RX ADMIN — Medication SCH ML: at 21:04

## 2018-04-09 RX ADMIN — LEVOTHYROXINE SODIUM ANHYDROUS SCH MCG: 100 INJECTION, POWDER, LYOPHILIZED, FOR SOLUTION INTRAVENOUS at 05:36

## 2018-04-09 RX ADMIN — HYDROMORPHONE HYDROCHLORIDE PRN MG: 4 TABLET ORAL at 03:32

## 2018-04-09 RX ADMIN — CEFTRIAXONE SCH MLS: 1 INJECTION, SOLUTION INTRAVENOUS at 10:14

## 2018-04-09 RX ADMIN — Medication SCH PATCH: at 10:25

## 2018-04-09 RX ADMIN — HYDROMORPHONE HYDROCHLORIDE PRN MG: 2 INJECTION INTRAMUSCULAR; INTRAVENOUS; SUBCUTANEOUS at 22:38

## 2018-04-09 RX ADMIN — FENTANYL CITRATE PRN MCG: 50 INJECTION, SOLUTION INTRAMUSCULAR; INTRAVENOUS at 04:11

## 2018-04-09 NOTE — RAD REPORT
EXAM DESCRIPTION:  RAD - Abdomen 1 View (KUB) - 4/9/2018 12:55 pm

 

CLINICAL HISTORY:  Abdomen pain.

 

FINDINGS:  The bowel gas pattern is unremarkable.

 

A feeding tube is present within the second portion of the duodenum

## 2018-04-09 NOTE — HP
Date of Admission:  04/07/2018



History Of Present Illness:  Mrs. Martinez is a 72-year-old female who has a significant chronic low 
back pain.  She has an indwelling intrathecal pump.  She was [facility] this past [facility].  It is possible that Mrs. Martinez could have taken more of 
her oral medications.  She was taken to the emergency room for possible altered mental status.  Initi
ally, she was intubated, now she is extubated but is quite agitated at this time.  I did speak with MARIANGEL Martinez over the phone regarding Mrs. Martinez's care.  The area of the pump was prepped and drap
ed in the usual fashion.  A specialized needle was inserted into that pump.  Aspiration revealed 2.4 
cc of clear fluid to discard.  Next, the syringe was labelled with Olga Martinez's name with the [facility]
e medications.  19 cc was injected upon, 1 cc was discarded, rate was kept the same.  We will follow 
up with her care on a regular basis here while she is in the ICU.





KURTIS

DD:  04/09/2018 12:06:47Voice ID:  [id number]

## 2018-04-09 NOTE — RAD REPORT
EXAM DESCRIPTION:  RAD - Chest Single View - 4/8/2018 11:24 pm

 

CLINICAL HISTORY:   Device placement PICC line placement

 

COMPARISON:  April 9, 2018

 

FINDINGS:   A PICC line has been inserted with its tip in the distal superior vena cava.

 

The lungs appear clear of acute infiltrate. The heart is normal size.

 

An endotracheal tube has its tip 4.5 centimeters above the cy. A nasogastric tube is present with
in the stomach

 

IMPRESSION:   PICC line with its tip in the distal superior vena cava

## 2018-04-09 NOTE — P.PN
Subjective


Date of Service: 04/09/18


Primary Care Provider: Dr. Mendez; Pain management-Dr. Ortiz


Chief Complaint: drug overdose


Subjective: Other (Patient appears with pain. Patient still with altered mental 

status)





Physical Examination





- Vital Signs


Temperature: 99.1 F


Blood Pressure: 170/103


Pulse: 144


Respirations: 24


Pulse Ox (%): 94





- Physical Exam


General: Alert, Other (Patient was extubated this morning.  Patient with 

altered mental status.  Patient with pain nonspecific to the back.)


HEENT: Atraumatic


Neck: Supple


Respiratory: Clear to auscultation bilaterally, Normal air movement


Cardiovascular: Abnormal pulses (Sinus tachycardia )


Gastrointestinal: Normal bowel sounds, Soft and benign, Non-distended, No 

tenderness, No masses, No rebound, No guarding


Musculoskeletal: No tenderness, No warmth


Neurological: Other (Patient with altered mental status.  Patient not 

appropriate at this time.)





- Studies


Medications List Reviewed: Yes





Assessment & Plan





- Problems (Diagnosis)


(1) Hypothyroidism


Current Visit: Yes   Status: Chronic   


Plan: 


Will continue with medication.


Qualifiers: 


   Hypothyroidism type: unspecified   Qualified Code(s): E03.9 - Hypothyroidism

, unspecified   





(2) UTI (urinary tract infection)


Current Visit: Yes   Status: Suspected   


Plan: 


Suspect UTI.  Urine culture pending.  Patient on Rocephin.


Qualifiers: 


   Urinary tract infection type: site unspecified   Hematuria presence: without 

hematuria   Qualified Code(s): N39.0 - Urinary tract infection, site not 

specified   





(3) Obesity


Current Visit: Yes   Status: Chronic   


Plan: 


Will address lifestyle modification education


Qualifiers: 


   Obesity type: due to excess calories   Obesity classification: adult class 1 

(BMI 30 - 34.9)   Serious obesity comorbidity presence: with serious 

comorbidity   Body mass index: BMI 31.0-31.9   Qualified Code(s): E66.09 - 

Other obesity due to excess calories; Z68.31 - Body mass index (BMI) 31.0-31.9, 

adult; Z68.31 - Body mass index (BMI) 31.0-31.9, adult   





(4) Back pain


Current Visit: Yes   Status: Chronic   


Plan: 


Spoke with her pain specialist.  He will come by and assess the pain pump.  He 

will also assess pain management.  Pain specialist mention that the patient has 

been hospitalized for similar issues in the past.  His plan is to take her off 

oral Dilaudid at discharge. Will continue to monitor and assess.


Qualifiers: 


   Back pain location: back pain in unspecified location   Chronicity: chronic 

  Back pain laterality: bilateral   Qualified Code(s): M54.9 - Dorsalgia, 

unspecified; G89.29 - Other chronic pain; G89.29 - Other chronic pain   





(5) Acute encephalopathy


Onset Date: 04/09/18   Current Visit: Yes   Status: Acute   


Plan: 


This is likely multifactorial.  Patient being covered with antibiotic therapy 

for possible UTI.  This also may be related to overdose of medications.  Will 

need to determine how much medication she took of Dilaudid, baclofen and 

Lipitor.  Tylenol level less than 10. Will order MRI of the brain to further 

evaluate.   reports that there may be some history of dementia.  Will 

consult her neurologist to further assess.  Will provide medication for pain. 

Will try to limit this.








(6) Chronic pain syndrome


Onset Date: 04/09/18   Current Visit: Yes   Status: Chronic   


Plan: 


Pain management to evaluate pump.








(7) Drug overdose


Onset Date: 04/09/18   Current Visit: Yes   Status: Acute   


Plan: 


Will need to determine whether this was intentional.   reported its that 

this is not likely intentional but he is unsure.  Pain management reports that 

this has happened in the past.  Once the patient is more alert then will need 

to assess for underlying intentional overdose.


Qualifiers: 


   Encounter type: initial encounter   Injury intent: undetermined intent   

Qualified Code(s): T50.904A - Poisoning by unspecified drugs, medicaments and 

biological substances, undetermined, initial encounter   





(8) Hyponatremia


Current Visit: Yes   Status: Acute   


Plan: 


Will continue with IV fluids.  Will monitor and adjust appropriately.








(9) Tachycardia


Current Visit: Yes   Status: Acute   


Plan: 


This is likely from pain. Will continue with IV fluids.  Will monitor closely.








(10) Hypertension


Current Visit: Yes   Status: Acute   


Plan: 


Patient with hypertension.  This may be pain related.  Will provide medication 

as needed.








(11) Hyperlipidemia


Current Visit: Yes   Status: Chronic   


Plan: 


Will hold medication at this time.


Qualifiers: 


   Hyperlipidemia type: unspecified   Qualified Code(s): E78.5 - Hyperlipidemia

, unspecified   


Discharge Plan: Home


Plan to discharge in: Greater than 2 days


Time Spent Managing Pts Care (In Minutes): 55

## 2018-04-10 RX ADMIN — CEFTRIAXONE SCH MLS: 1 INJECTION, SOLUTION INTRAVENOUS at 08:12

## 2018-04-10 RX ADMIN — SODIUM CHLORIDE SCH MLS: 0.9 INJECTION, SOLUTION INTRAVENOUS at 01:10

## 2018-04-10 RX ADMIN — AMOXICILLIN AND CLAVULANATE POTASSIUM SCH MG: 875; 125 TABLET, FILM COATED ORAL at 21:08

## 2018-04-10 RX ADMIN — CHOLECALCIFEROL TAB 25 MCG (1000 UNIT) SCH UNIT: 25 TAB at 08:13

## 2018-04-10 RX ADMIN — AMIODARONE HYDROCHLORIDE SCH MG: 200 TABLET ORAL at 08:12

## 2018-04-10 RX ADMIN — Medication SCH ML: at 21:08

## 2018-04-10 RX ADMIN — DICLOFENAC SODIUM SCH MG: 75 TABLET, DELAYED RELEASE ORAL at 08:13

## 2018-04-10 RX ADMIN — Medication SCH ML: at 08:14

## 2018-04-10 RX ADMIN — HYDROMORPHONE HYDROCHLORIDE PRN MG: 2 INJECTION INTRAMUSCULAR; INTRAVENOUS; SUBCUTANEOUS at 05:46

## 2018-04-10 RX ADMIN — NICOTINE SCH MG: 21 PATCH TRANSDERMAL at 13:04

## 2018-04-10 RX ADMIN — LEVOTHYROXINE SODIUM ANHYDROUS SCH MCG: 100 INJECTION, POWDER, LYOPHILIZED, FOR SOLUTION INTRAVENOUS at 05:50

## 2018-04-10 RX ADMIN — CHOLECALCIFEROL TAB 25 MCG (1000 UNIT) SCH UNIT: 25 TAB at 21:09

## 2018-04-10 RX ADMIN — ZIPRASIDONE MESYLATE PRN MG: 20 INJECTION, POWDER, LYOPHILIZED, FOR SOLUTION INTRAMUSCULAR at 01:11

## 2018-04-10 RX ADMIN — ALPRAZOLAM SCH MG: 0.5 TABLET ORAL at 08:12

## 2018-04-10 RX ADMIN — SODIUM CHLORIDE SCH: 0.9 INJECTION, SOLUTION INTRAVENOUS at 11:14

## 2018-04-10 RX ADMIN — APIXABAN SCH MG: 5 TABLET, FILM COATED ORAL at 10:12

## 2018-04-10 RX ADMIN — APIXABAN SCH MG: 5 TABLET, FILM COATED ORAL at 21:08

## 2018-04-10 RX ADMIN — ATORVASTATIN CALCIUM SCH MG: 40 TABLET, FILM COATED ORAL at 21:08

## 2018-04-10 RX ADMIN — Medication SCH PATCH: at 08:13

## 2018-04-10 RX ADMIN — DICLOFENAC SODIUM SCH MG: 75 TABLET, DELAYED RELEASE ORAL at 21:09

## 2018-04-10 NOTE — RAD REPORT
EXAM DESCRIPTION:  RAD - Chest Single View - 4/10/2018 12:46 pm

 

CLINICAL HISTORY:  PICC line placement.

 

COMPARISON:  None.

 

FINDINGS:  Portable chest was obtained following placement of a right upper extremity PICC line. The 
catheter tip is in the right subclavian vein.

 

IMPRESSION:  Right PICC line tip is in the right subclavian vein.

## 2018-04-10 NOTE — RAD REPORT
EXAM DESCRIPTION:  MRI - Brain Wo Cont - 4/10/2018 5:33 pm

 

CLINICAL HISTORY:  Altered consciousness

 

COMPARISON:  CT head 04/07/2018

 

TECHNIQUE:  Multi-sequence, multiplanar MR imaging of the brain was performed without contrast.

 

FINDINGS:  The patient refused the entire examination. Limited pulse sequences are submitted. Diffusi
on-weighted imaging is not submitted.

 

No intracranial hemorrhage, hydrocephalus or extra-axial fluid collections.Moderate confluent T2/FLAI
R hyperintensity in the periventricular and deep white matter is present compatible with chronic micr
ovascular ischemic changes. No edema or shift of midline structures. Diffusion-weighted imaging was n
ot performed.

 

Midline structures are normally formed.

 

Fluid is present in both mastoid air cells.

 

IMPRESSION:  Limited examination as the patient refused to complete the entire study. No acute or wor
risome intracranial finding seen within this limitation.

 

Fluid is present in both mastoid air cells.

## 2018-04-10 NOTE — CON
Reason For Consultation:  Consultation called by Dr. Torres because of altered 
mental status.



History Of Present Illness:  Ms. Martinez is a 72-year-old patient whom I have 
just started seeing in clinic for likely vascular dementia.  Her MOCA test in 
the office done a month ago was 15/30.  The workup did include a normal EEG.  
She did not get a brain MRI.  She also had blood work showing a slightly low 
vitamin D level and elevated TSH, which was to be further worked up.  The 
reason for this current admission is that the patient was found confused and 
very poorly responsive by her  with a potential overdose of oxycodone 
and other medications including other narcotic medications.  She was brought 
into [facility] on the 7th and was intubated for airway protection. 



Her ABG after intubation showed a pH of 7.29 and pCO2 was 56.6, and O2 is 333.  
She did have low sodium of 132.  Her TSH was elevated at 7.46.  She did have 
later slightly elevated procalcitonin, and her chest x-ray suggested the 
possibility of a potential pneumonia.  The patient was treated with IV 
antibiotics including Rocephin and is now extubated.  She has been agitated and 
has been given Haldol, Dilaudid, and has actually had a pain pump refilled by 
Dr. Ortiz.  As of today, the patient has been agitated with sit up, moved 
the arms and legs equally well.  She did follow commands to move the legs 
actually and not particularly to move the arms, but voluntarily would move the 
arms very well, was held very strongly onto the side rails of the bed.



Past Medical History:  As indicated above in addition to cardiac rhythm, 
dyslipidemia, chronic pain with pain pump, vascular dementia, hypothyroidism.



Social History:  The patient reportedly is not on oxycodone, but it was found 
positive on urine drug screen and then had a pain pump in place.



Family History:  Noncontributory.



Allergies:  SULFUR.



Home Medications:  Alprazolam 0.5 mg daily, atorvastatin 40 mg daily, 
omeprazole 20 mg daily, Flonase as needed, levothyroxine 25 mcg daily, 
diclofenac 75 mg daily, vitamin D 2000 units twice daily, Dilaudid 4 mg daily, 
amiodarone 200 mg daily, Eliquis 5 mg twice daily, and a pain pump with 
bupivacaine.



Review of Systems:

The patient is not answering questions at this point to complete the review of 
systems.



Physical Examination:

Vital Signs:  Blood pressure 152/95, pulse up to 123, respiratory rate 18 to 24
, temperature 99.9, oxygen saturation 98% on 2 L oxygen by nasal cannula.  
Weight 203 pounds.  Height 5 feet 8 inches. 

General:  Ms. Martinez was resting in bed.  She is some agitated.  She is 
lifting up in bed, moving the arms and legs equally well. 

HEENT:  She is normocephalic and atraumatic.  Sclerae anicteric.  Oropharynx is 
moist. 

Neck:  Supple. 

Chest:  Showing good air movement bilaterally. 

Abdomen:  Soft. 

Extremities:  Show no edema or cyanosis. 

Neurological:  The patient did answer to her name and follows simple commands 
to lift the leg and answers yes, no questions as well.  In terms of cranial 
nerves, no focal deficits there.  Motor, she has equal tone in the upper and 
lower extremities.  No focal weakness found.  Sensory exam, responded to light 
touch in the arms equally well unable to fully assess coordination, gait, and 
reflexes.



Laboratory Studies:  Sodium 132, potassium 4.2, chloride 100, carbon dioxide 27
, BUN 21, creatinine 0.66, glucose 128, calcium 8.9, AST 23, ALT 20, free T4 
0.96.  TSH 7.46.  



Hemoglobin 12.8, hematocrit 39.3, WBCs 12.3 with 76% neutrophils. 



Urine drug screen is positive for benzodiazepines, positive for opiates and 
positive for oxycodone. 



Head CT scan shows no acute ischemic or hemorrhagic change.  Small vessel 
ischemic disease with mild generalized atrophy identified. 



Chest x-ray from earlier today shows PICC line in place.  Lungs did appear 
clear. 



KUB showed no obstruction in pattern. 



Electrocardiogram showed sinus bradycardia.



Assessment:  Ms. Martinez is a 72-year-old patient with multifactorial reasons 
for encephalopathy including a baseline vascular dementia and likely multiple 
narcotic drug overdose along with benzodiazepine and acidotic from likely 
hypoxia and was found down.  She does not have any evidence of a focal 
neurological deficit, therefore unlikely to have a stroke as a component of 
altered mental status.  She also has the potential for infection, potentially 
pneumonia.  She has a slightly elevated procalcitonin, slightly elevated white 
blood cell count with neutrophil shift.  She is on Rocephin for that.



Plan:  

1.   If the patient is able to, MRI of the brain would be helpful as well as 
routine EEG. 

2.   Unlikely that the patient will have any benefit from a lumbar puncture, we 
will not proceed with that at this time.

3.   The patient will be followed after MRI and EEG are complete.  However, I 
suspect she will improve significantly over the next 2-3 days. 





CLAUDIA

DD:  04/09/2018 19:23:26   Voice ID:  [id number]

DT:  04/10/2018 00:26:30   Report ID:  [id number]

MTDD

## 2018-04-10 NOTE — RAD REPORT
EXAM DESCRIPTION:  RAD - Chest Single View - 4/10/2018 6:35 am

 

CLINICAL HISTORY:  Shortness of breath

 

COMPARISON:  April 8

 

TECHNIQUE:  AP portable chest image was obtained 0624 hours .

 

FINDINGS:  Lung volumes are similar to the prior study. There is mild prominence of the interstitial 
markings similar to the prior study. No new mass or consolidation. There is some motion degradation a
t the lung bases. PICC line remains in good position. NG tube has been removed. No failure or volume 
overload findings. Left base assessment is limited. No measurable pleural effusion and no pneumothora
x. No gross bony abnormality seen. No acute aortic findings suspected.

 

IMPRESSION:  Left base assessment is limited. Patchy left lung base pneumonia cannot be excluded on t
his portable examination.

 

No failure or volume overload. No gross change to the chest since April 8.

## 2018-04-10 NOTE — RAD REPORT
EXAM DESCRIPTION:  MRI - MRA Head Wo Cont - 4/10/2018 5:33 pm

 

CLINICAL HISTORY:  AMS

 

COMPARISON:  None.

 

TECHNIQUE:  Axial and coronal 3D time-of-flight image acquisition was performed. 3D rotational images
 were generated with source and reconstruction images reviewed.

 

FINDINGS:  No aneurysm or vascular malformation. Left vertebral artery is dominant with a very small 
distal right vertebral artery. Moderate severity atherosclerotic changes involves the right middle ce
rebral artery branches. Minimal anterior cerebral and posterior cerebral changes noted.

 

Bilateral internal carotids arteries are normal. Bulbs were not fully imaged. No dissection.

 

IMPRESSION:  Atherosclerotic changes are present, moderate in the right MCA.

 

No occlusion or high grade stenosis. No dissection.

 

No AVM or aneurysm.

## 2018-04-11 LAB
BUN BLD-MCNC: 11 MG/DL (ref 6–20)
GLUCOSE SERPLBLD-MCNC: 101 MG/DL (ref 65–120)
HCT VFR BLD CALC: 40.6 % (ref 36–45)
LYMPHOCYTES # SPEC AUTO: 2.7 K/UL (ref 0.7–4.9)
MAGNESIUM SERPL-MCNC: 1.7 MG/DL (ref 1.8–2.5)
MCH RBC QN AUTO: 28 PG (ref 27–35)
MCV RBC: 85.9 FL (ref 80–100)
PMV BLD: 8.5 FL (ref 7.6–11.3)
POTASSIUM SERPL-SCNC: 3.4 MEQ/L (ref 3.6–5)
RBC # BLD: 4.72 M/UL (ref 3.86–4.86)

## 2018-04-11 RX ADMIN — Medication SCH ML: at 21:42

## 2018-04-11 RX ADMIN — PANTOPRAZOLE SODIUM SCH MG: 40 TABLET, DELAYED RELEASE ORAL at 05:41

## 2018-04-11 RX ADMIN — CHOLECALCIFEROL TAB 25 MCG (1000 UNIT) SCH UNIT: 25 TAB at 21:40

## 2018-04-11 RX ADMIN — DICLOFENAC SODIUM SCH MG: 75 TABLET, DELAYED RELEASE ORAL at 09:58

## 2018-04-11 RX ADMIN — POTASSIUM CHLORIDE SCH MLS: 200 INJECTION, SOLUTION INTRAVENOUS at 13:19

## 2018-04-11 RX ADMIN — LEVOTHYROXINE SODIUM SCH MG: 25 TABLET ORAL at 05:41

## 2018-04-11 RX ADMIN — APIXABAN SCH MG: 5 TABLET, FILM COATED ORAL at 21:40

## 2018-04-11 RX ADMIN — APIXABAN SCH MG: 5 TABLET, FILM COATED ORAL at 09:56

## 2018-04-11 RX ADMIN — DICLOFENAC SODIUM SCH MG: 75 TABLET, DELAYED RELEASE ORAL at 21:45

## 2018-04-11 RX ADMIN — Medication SCH ML: at 09:00

## 2018-04-11 RX ADMIN — HYDROMORPHONE HYDROCHLORIDE PRN MG: 2 INJECTION INTRAMUSCULAR; INTRAVENOUS; SUBCUTANEOUS at 17:55

## 2018-04-11 RX ADMIN — AMIODARONE HYDROCHLORIDE SCH MG: 200 TABLET ORAL at 09:56

## 2018-04-11 RX ADMIN — ATORVASTATIN CALCIUM SCH MG: 40 TABLET, FILM COATED ORAL at 21:40

## 2018-04-11 RX ADMIN — Medication SCH: at 09:00

## 2018-04-11 RX ADMIN — AMOXICILLIN AND CLAVULANATE POTASSIUM SCH MG: 875; 125 TABLET, FILM COATED ORAL at 09:56

## 2018-04-11 RX ADMIN — CHOLECALCIFEROL TAB 25 MCG (1000 UNIT) SCH UNIT: 25 TAB at 09:57

## 2018-04-11 RX ADMIN — POTASSIUM CHLORIDE SCH MLS: 200 INJECTION, SOLUTION INTRAVENOUS at 09:54

## 2018-04-11 RX ADMIN — Medication SCH PATCH: at 09:56

## 2018-04-11 RX ADMIN — NICOTINE SCH MG: 21 PATCH TRANSDERMAL at 09:56

## 2018-04-11 RX ADMIN — AMOXICILLIN AND CLAVULANATE POTASSIUM SCH MG: 875; 125 TABLET, FILM COATED ORAL at 21:39

## 2018-04-11 RX ADMIN — ALPRAZOLAM SCH MG: 0.5 TABLET ORAL at 09:58

## 2018-04-11 NOTE — EEG
CHART:  [id number]

TEST ID#:  [id number]

DATE OF STUDY:  04/10/2018



THE EEG WAS RECORDED PORTABLE IN THE ICU ON A 17 CHANNEL MACHINE.  ELECTRODES WERE APPLIED 
IN THE USUAL MANNER USING THE INTERNATIONAL 10-20 SYSTEM.



THE WAKING BACKGROUND RHYTHM IN THIS RECORD CONSISTS OF WELL DEVELOPED AND  WELL ORGANIZED 
WAVES OF 8.5 HZ., MAXIMAL IN THE POSTERIOR HEAD REGIONS WHICH ATTENUATE NORMALLY WITH EYE 
OPENING.  LOW-VOLTAGE 18-22 HZ ACTIVITY IS EXPRESSED IN THE FRONTAL AND CENTRAL REGIONS.



THERE ARE NO FOCAL OR LATERALIZING FEATURES.  NO EPILEPTIFORM ACTIVITY APPEARS.  

SLEEP DID NOT OCCUR.  



HYPERVENTILATION WAS NOT PERFORMED.



PHOTIC STIMULATION PRODUCED POOR DRIVING BILATERALLY.



IMPRESSION:  NORMAL EEG FOR THE AGE OF THE PATIENT IN [place].

## 2018-04-11 NOTE — P.PN
Subjective


Date of Service: 04/11/18


Primary Care Provider: Dr. Mendez; Pain management-Dr. Ortiz


Chief Complaint: drug overdose


Subjective: Doing well (Patient doing well this time.  No complaints noted.)





Physical Examination





- Vital Signs


Temperature: 97.0 F


Blood Pressure: 142/83


Pulse: 96


Respirations: 18


Pulse Ox (%): 95





- Physical Exam


General: Alert, Cooperative, Demented


HEENT: Atraumatic


Neck: Supple


Respiratory: Clear to auscultation bilaterally, Normal air movement


Cardiovascular: Normal pulses, Regular rate/rhythm


Gastrointestinal: Normal bowel sounds, Soft and benign, Non-distended, No 

tenderness, No masses, No rebound, No guarding


Musculoskeletal: No erythema, No tenderness, No warmth


Integumentary: No tenderness/swelling, No erythema, No warmth, No cyanosis


Neurological: Normal speech, Normal strength at 5/5 x4 extr, Normal tone, 

Dementia





- Studies


Medications List Reviewed: Yes





Assessment & Plan





- Problems (Diagnosis)


(1) Hypothyroidism


Current Visit: Yes   Status: Chronic   


Plan: 


Will continue with medication.  Patient improved.  Will monitor closely.





Spoke at length to family.  They require patient go to a skilled facility.  

Will check with physical therapy and  to see if she meets 

criteria.  This will help her rehabilitate to the point to where she can 

ambulate as she was doing before.  Patient will likely need long-term care for 

dementia thereafter.  Family is agreeable to this.  They feel that she is 

unsafe to go home as she is high risk for falls.  Will discuss with social 

services and physical therapy.


Qualifiers: 


   Hypothyroidism type: unspecified   Qualified Code(s): E03.9 - Hypothyroidism

, unspecified   





(2) Obesity


Current Visit: Yes   Status: Chronic   


Plan: 


Will address lifestyle modification education


Qualifiers: 


   Obesity type: due to excess calories   Obesity classification: adult class 1 

(BMI 30 - 34.9)   Serious obesity comorbidity presence: with serious 

comorbidity   Body mass index: BMI 31.0-31.9   Qualified Code(s): E66.09 - 

Other obesity due to excess calories; Z68.31 - Body mass index (BMI) 31.0-31.9, 

adult; Z68.31 - Body mass index (BMI) 31.0-31.9, adult   





(3) Back pain


Current Visit: Yes   Status: Chronic   


Plan: 


Her pain specialist did come by to refill the pain pump.  It was time to refill 

it.  Pain pump appears to be working appropriately.  Pain well controlled.  

Will pursue skilled placement.  Pain management recommends that she no longer 

use oral pain medication or muscle relaxer.  Will continue with above plan of 

care. 


Qualifiers: 


   Back pain location: back pain in unspecified location   Chronicity: chronic 

  Back pain laterality: bilateral   Qualified Code(s): M54.9 - Dorsalgia, 

unspecified; G89.29 - Other chronic pain; G89.29 - Other chronic pain   





(4) Acute encephalopathy


Onset Date: 04/09/18   Current Visit: Yes   Status: Acute   


Plan: 


This is likely multifactorial.  This may be related to underlying pneumonia and 

overmedication of pain medication.  UTI  ruled out.  Patient much improved.  

MRI brain showed no acute stroke.  Chronic ischemic changes noted. Patient 

likely with vascular dementia as noted by neurology.  Will have physical 

therapy ambulate patient and qualify for skilled placement as requested by 

family.  Patient high risk to go home due to possible fall risk.  Will pursue 

skilled placement.  Patient may require long-term care thereafter for her 

dementia.  It is getting more difficult for the  to take care of her at 

home.








(5) Chronic pain syndrome


Onset Date: 04/09/18   Current Visit: Yes   Status: Chronic   


Plan: 


Pain management refilled pain pump.  Pain pump is working appropriately.  Pain 

management recommends no further use of oral pain medication at discharge.








(6) Drug overdose


Onset Date: 04/09/18   Current Visit: Yes   Status: Acute   


Plan: 


Continue with above plan of care.


Qualifiers: 


   Encounter type: initial encounter   Injury intent: undetermined intent   

Qualified Code(s): T50.904A - Poisoning by unspecified drugs, medicaments and 

biological substances, undetermined, initial encounter   





(7) Hyponatremia


Current Visit: Yes   Status: Acute   


Plan: 


Will continue with IV fluids.  Will monitor and adjust appropriately.








(8) Tachycardia


Current Visit: Yes   Status: Acute   


Plan: 


This has resolved.  Will monitor closely.








(9) Hypertension


Current Visit: Yes   Status: Acute   


Plan: 


Blood pressure is better stable.  Patient is taking medication for atrial 

fibrillation.  Patient may require medication at discharge if significantly 

elevated.








(10) Hyperlipidemia


Current Visit: Yes   Status: Chronic   


Plan: 


Will hold medication at this time.


Qualifiers: 


   Hyperlipidemia type: unspecified   Qualified Code(s): E78.5 - Hyperlipidemia

, unspecified   





(11) Vascular dementia


Current Visit: Yes   Status: Chronic   


Plan: 


Patient with underlying vascular dementia.  MRI shows no acute stroke.  EEG 

pending.  Continue with above plan of care.


Qualifiers: 


   Dementia behavioral disturbance: without behavioral disturbance   Qualified 

Code(s): F01.50 - Vascular dementia without behavioral disturbance   





(12) Pneumonia


Current Visit: Yes   Status: Acute   


Plan: 


Possible pneumonia noted. Will recheck chest x-ray today.  Patient on 

antibiotic therapy.  Patient not requiring any oxygen at this time.


Qualifiers: 


   Pneumonia type: due to unspecified organism   Laterality: left   Lung 

location: lower lobe of lung   Qualified Code(s): J18.1 - Lobar pneumonia, 

unspecified organism   





(13) Atrial fibrillation


Current Visit: Yes   Status: Chronic   


Plan: 


Will continue with her rate control medication.  Patient on chronic anti 

coagulation therapy.


Qualifiers: 


   Atrial fibrillation type: chronic   Qualified Code(s): I48.2 - Chronic 

atrial fibrillation   





(14) Chronic anticoagulation


Current Visit: Yes   Status: Chronic   


Plan: 


Continue with chronic anti coagulation therapy.





Discharge Plan: Other (Skilled placement)


Plan to discharge in: 24 Hours


Time Spent Managing Pts Care (In Minutes): 55

## 2018-04-11 NOTE — RAD REPORT
EXAM DESCRIPTION:  RAD - Chest Pa And Lat (2 Views) - 4/11/2018 9:23 am

 

CLINICAL HISTORY:  Pneumonia

 

COMPARISON:  04/10/2018

 

FINDINGS:  Linear subsegmental atelectasis is present left lung base laterally. Trace left pleural fl
uid. The heart is normal in size. No displaced fractures. Right-sided PICC line has tip in the subcla
vian vein.

 

IMPRESSION:  Improvement in bibasilar lung opacities compatible with improving pneumonia.

## 2018-04-12 LAB
BUN BLD-MCNC: 10 MG/DL (ref 6–20)
GLUCOSE SERPLBLD-MCNC: 103 MG/DL (ref 65–120)
HCT VFR BLD CALC: 34.5 % (ref 36–45)
LYMPHOCYTES # SPEC AUTO: 2.9 K/UL (ref 0.7–4.9)
MAGNESIUM SERPL-MCNC: 1.8 MG/DL (ref 1.8–2.5)
MCH RBC QN AUTO: 27.7 PG (ref 27–35)
MCV RBC: 85.2 FL (ref 80–100)
PMV BLD: 8.1 FL (ref 7.6–11.3)
POTASSIUM SERPL-SCNC: 3.9 MEQ/L (ref 3.6–5)
RBC # BLD: 4.05 M/UL (ref 3.86–4.86)

## 2018-04-12 RX ADMIN — Medication SCH ML: at 09:00

## 2018-04-12 RX ADMIN — DICLOFENAC SODIUM SCH MG: 75 TABLET, DELAYED RELEASE ORAL at 21:00

## 2018-04-12 RX ADMIN — CHOLECALCIFEROL TAB 25 MCG (1000 UNIT) SCH UNIT: 25 TAB at 21:00

## 2018-04-12 RX ADMIN — CHOLECALCIFEROL TAB 25 MCG (1000 UNIT) SCH UNIT: 25 TAB at 09:40

## 2018-04-12 RX ADMIN — PANTOPRAZOLE SODIUM SCH MG: 40 TABLET, DELAYED RELEASE ORAL at 05:54

## 2018-04-12 RX ADMIN — AMOXICILLIN AND CLAVULANATE POTASSIUM SCH MG: 875; 125 TABLET, FILM COATED ORAL at 09:40

## 2018-04-12 RX ADMIN — LEVOTHYROXINE SODIUM SCH MG: 25 TABLET ORAL at 05:54

## 2018-04-12 RX ADMIN — Medication SCH PATCH: at 09:41

## 2018-04-12 RX ADMIN — AMOXICILLIN AND CLAVULANATE POTASSIUM SCH MG: 875; 125 TABLET, FILM COATED ORAL at 21:00

## 2018-04-12 RX ADMIN — APIXABAN SCH MG: 5 TABLET, FILM COATED ORAL at 21:00

## 2018-04-12 RX ADMIN — APIXABAN SCH MG: 5 TABLET, FILM COATED ORAL at 09:40

## 2018-04-12 RX ADMIN — ALPRAZOLAM SCH MG: 0.5 TABLET ORAL at 14:36

## 2018-04-12 RX ADMIN — DICLOFENAC SODIUM SCH MG: 75 TABLET, DELAYED RELEASE ORAL at 09:41

## 2018-04-12 RX ADMIN — AMIODARONE HYDROCHLORIDE SCH MG: 200 TABLET ORAL at 09:40

## 2018-04-12 RX ADMIN — NICOTINE SCH MG: 21 PATCH TRANSDERMAL at 09:41

## 2018-04-12 RX ADMIN — ATORVASTATIN CALCIUM SCH MG: 40 TABLET, FILM COATED ORAL at 21:00

## 2018-04-12 NOTE — PN
Subjective:  The patient was confused on the floor.  Now, she is actually more alert, oriented, follo
ws simple commands without any difficulty.  She does has some minor complaints of lower back pain.  S
he is actually on multiple pain medications as managed by Dr. Ortiz including with pain pump.



Objective:  Vital Signs:  Blood pressure 118/58, pulse 89, respiratory rate 18, temperature 97.8, oxy
gen saturation 94%. 

General:  Ms. Martinez is resting in bed, sitting, and in no significant distress.  Pain around 3-4 b
ut at times can go up to 9. 

Neurologic:  Cranial nerves show no focal deficits.  Motor exam shows no focal deficits in the arms a
nd legs.  Coordination appears intact although slow.



Laboratory Studies:  White blood cell count 11, hemoglobin 13.3, hematocrit 40.6, platelets 192.  Amina
mistries; sodium 137, potassium 3.4, chloride 102, magnesium slightly low at 1.7.



Diagnostic Data:  She had a chest x-ray done earlier today that shows improved bibasilar opacities in
dicating improved pneumonia.  She had a brain MRI yesterday that showed moderate small-vessel ischemi
c disease.  No acute findings.  MRA of the neck shows arthrosclerotic changes, moderate in the right 
internal carotid artery but no occlusion or high-grade stenosis seen.  No AVMs or aneurysm seen.  She
 did have an EEG and that was a normal study.



Assessment:  Ms. Martinez is a 72-year-old patient with multifactorial encephalopathy including a bas
luis miguel vascular dementia and toxic related encephalopathy from her pneumonia and electrolyte abnormali
ties.  She is returning towards her baseline.  The patient's condition was discussed with her 
 and son.  Due to her chronic dementia with the acute component and her development of somewhat diffu
se weakness and debility from being hospitalized, it is recommended that she be in a skilled nursing 
facility, where there is physical, occupational, and speech therapy for help with her cognitive impro
vement towards baseline.  When she was seen in clinic, she had a MOCA test demonstrating a level of 1
4/30, significant with moderate-to-severe dementia.  Given this level of dementia at baseline, the pa
tient would require 24 hour care and supervision for safety issues to reduce risk of falls in injury 
to self.



Plan:  It is recommended the patient be transferred to skilled nursing to continue her recovery and t
o have physical, occupational, and speech therapy.  In addition, to continue antibiotics as per prima
ry team as well she should be on Eliquis 5 mg twice daily for DVT and stroke risk reduction.  Continu
e with Synthroid as appropriate and continue with Lipitor for stroke risk reduction.  The patient may
 be followed by Dr. Monroy clinic one month after her discharge.





ADELAIDA/LISA

DD:  04/11/2018 19:05:56Voice ID:  [id number]

DT:  04/12/2018 00:31:14Report ID:  [id number]

## 2018-04-12 NOTE — P.PN
Subjective


Date of Service: 04/12/18


Primary Care Provider: Dr. Mendez; Pain management-Dr. Ortiz


Chief Complaint: drug overdose


Subjective: Improving (Patient continues to improve.  Patient work with 

physical therapy but still requires assistance.  Awaiting skilled placement)





Physical Examination





- Vital Signs


Temperature: 97.1 F


Blood Pressure: 117/58


Pulse: 80


Respirations: 18


Pulse Ox (%): 94





- Physical Exam


General: Alert, In no apparent distress, Cooperative, Demented (Mild)


HEENT: Atraumatic


Neck: Supple


Respiratory: Clear to auscultation bilaterally, Normal air movement


Cardiovascular: Normal pulses, Regular rate/rhythm


Gastrointestinal: Normal bowel sounds, Soft and benign, Non-distended, No 

tenderness, No masses, No rebound, No guarding


Musculoskeletal: No erythema, No tenderness, No warmth


Integumentary: No erythema, No warmth, No cyanosis


Neurological: Normal speech, Normal strength at 5/5 x4 extr, Normal tone, 

Dementia (Mild.  Patient appropriate.)





- Studies


Medications List Reviewed: Yes





Assessment & Plan





- Problems (Diagnosis)


(1) Hypothyroidism


Current Visit: Yes   Status: Chronic   


Plan: 


Will continue with medication.  Patient improved.  Will monitor closely.





Spoke at length to family yesterday.  Patient at risk for fall at home.  

Patient still requiring some assistance with physical therapy.  Patient would 

benefit with skilled placement.  Case discussed at length with .

  Await approval by insurance.


Qualifiers: 


   Hypothyroidism type: unspecified   Qualified Code(s): E03.9 - Hypothyroidism

, unspecified   





(2) Obesity


Current Visit: Yes   Status: Chronic   


Plan: 


Will continue to address lifestyle modification education


Qualifiers: 


   Obesity type: due to excess calories   Obesity classification: adult class 1 

(BMI 30 - 34.9)   Serious obesity comorbidity presence: with serious 

comorbidity   Body mass index: BMI 31.0-31.9   Qualified Code(s): E66.09 - 

Other obesity due to excess calories; Z68.31 - Body mass index (BMI) 31.0-31.9, 

adult; Z68.31 - Body mass index (BMI) 31.0-31.9, adult   





(3) Back pain


Current Visit: Yes   Status: Chronic   


Plan: 


Her pain specialist did come by to refill the pain pump.  It was time to refill 

it.  Pain pump appears to be working appropriately.  Pain well controlled.  

Will pursue skilled placement.  Pain management recommends that she no longer 

use oral pain medication or muscle relaxer.  Will continue with above plan of 

care. 


Qualifiers: 


   Back pain location: back pain in unspecified location   Chronicity: chronic 

  Back pain laterality: bilateral   Qualified Code(s): M54.9 - Dorsalgia, 

unspecified; G89.29 - Other chronic pain; G89.29 - Other chronic pain   





(4) Acute encephalopathy


Onset Date: 04/09/18   Current Visit: Yes   Status: Acute   


Plan: 


This is likely multifactorial.  This may be related to underlying pneumonia and 

overmedication of pain medication.  UTI  ruled out.  Patient much improved.  

MRI brain showed no acute stroke.  Chronic ischemic changes noted. Patient 

likely with vascular dementia as noted by neurology.  This has resolved.  Await 

skilled placement.








(5) Chronic pain syndrome


Onset Date: 04/09/18   Current Visit: Yes   Status: Chronic   


Plan: 


Pain management refilled pain pump.  Pain pump is working appropriately.  Pain 

management recommends no further use of oral pain medication at discharge.








(6) Drug overdose


Onset Date: 04/09/18   Current Visit: Yes   Status: Acute   


Plan: 


Continue with above plan of care.


Qualifiers: 


   Encounter type: initial encounter   Injury intent: undetermined intent   

Qualified Code(s): T50.904A - Poisoning by unspecified drugs, medicaments and 

biological substances, undetermined, initial encounter   





(7) Hyponatremia


Current Visit: Yes   Status: Acute   


Plan: 


Will continue with IV fluids.  Will monitor and adjust appropriately.








(8) Tachycardia


Current Visit: Yes   Status: Acute   


Plan: 


This has resolved.  Will monitor closely.








(9) Hypertension


Current Visit: Yes   Status: Chronic   


Plan: 


Blood pressure stable


Qualifiers: 


   Hypertension type: essential hypertension   Qualified Code(s): I10 - 

Essential (primary) hypertension   





(10) Hyperlipidemia


Current Visit: Yes   Status: Chronic   


Plan: 


Will hold medication at this time.  Will restarted discharge


Qualifiers: 


   Hyperlipidemia type: unspecified   Qualified Code(s): E78.5 - Hyperlipidemia

, unspecified   





(11) Vascular dementia


Current Visit: Yes   Status: Chronic   


Plan: 


Patient with underlying vascular dementia.  MRI shows no acute stroke but 

chronic ischemic changes noted. Will discuss with Neurology.  Patient awaiting 

skilled placement approval


Qualifiers: 


   Dementia behavioral disturbance: without behavioral disturbance   Qualified 

Code(s): F01.50 - Vascular dementia without behavioral disturbance   





(12) Pneumonia


Current Visit: Yes   Status: Acute   


Plan: 


Possible pneumonia noted. Will continue with antibiotic therapy.  Patient not 

requiring any oxygen.


Qualifiers: 


   Pneumonia type: due to unspecified organism   Laterality: left   Lung 

location: lower lobe of lung   Qualified Code(s): J18.1 - Lobar pneumonia, 

unspecified organism   





(13) Atrial fibrillation


Current Visit: Yes   Status: Chronic   


Plan: 


Will continue with her rate control medication.  Patient on chronic anti 

coagulation therapy.


Qualifiers: 


   Atrial fibrillation type: chronic   Qualified Code(s): I48.2 - Chronic 

atrial fibrillation   





(14) Chronic anticoagulation


Current Visit: Yes   Status: Chronic   


Plan: 


Continue with chronic anti coagulation therapy.





Discharge Plan: Other (Skilled placement)


Plan to discharge in: 24 Hours


Time Spent Managing Pts Care (In Minutes): 55

## 2018-04-13 LAB
BUN BLD-MCNC: 10 MG/DL (ref 6–20)
GLUCOSE SERPLBLD-MCNC: 95 MG/DL (ref 65–120)
HCT VFR BLD CALC: 35.3 % (ref 36–45)
LYMPHOCYTES # SPEC AUTO: 3 K/UL (ref 0.7–4.9)
MAGNESIUM SERPL-MCNC: 1.9 MG/DL (ref 1.8–2.5)
MCH RBC QN AUTO: 28.2 PG (ref 27–35)
MCV RBC: 86.2 FL (ref 80–100)
PMV BLD: 9.1 FL (ref 7.6–11.3)
POTASSIUM SERPL-SCNC: 4.2 MEQ/L (ref 3.6–5)
RBC # BLD: 4.1 M/UL (ref 3.86–4.86)

## 2018-04-13 RX ADMIN — DICLOFENAC SODIUM SCH MG: 75 TABLET, DELAYED RELEASE ORAL at 09:03

## 2018-04-13 RX ADMIN — LEVOTHYROXINE SODIUM SCH MG: 25 TABLET ORAL at 05:33

## 2018-04-13 RX ADMIN — CHOLECALCIFEROL TAB 25 MCG (1000 UNIT) SCH UNIT: 25 TAB at 20:33

## 2018-04-13 RX ADMIN — APIXABAN SCH MG: 5 TABLET, FILM COATED ORAL at 09:04

## 2018-04-13 RX ADMIN — Medication SCH ML: at 20:33

## 2018-04-13 RX ADMIN — PANTOPRAZOLE SODIUM SCH MG: 40 TABLET, DELAYED RELEASE ORAL at 05:33

## 2018-04-13 RX ADMIN — ALPRAZOLAM SCH MG: 0.5 TABLET ORAL at 09:04

## 2018-04-13 RX ADMIN — DICLOFENAC SODIUM SCH MG: 75 TABLET, DELAYED RELEASE ORAL at 20:32

## 2018-04-13 RX ADMIN — APIXABAN SCH MG: 5 TABLET, FILM COATED ORAL at 20:32

## 2018-04-13 RX ADMIN — Medication SCH ML: at 09:06

## 2018-04-13 RX ADMIN — Medication SCH ML: at 01:20

## 2018-04-13 RX ADMIN — Medication SCH: at 09:00

## 2018-04-13 RX ADMIN — Medication SCH ML: at 01:21

## 2018-04-13 RX ADMIN — ATORVASTATIN CALCIUM SCH MG: 40 TABLET, FILM COATED ORAL at 20:32

## 2018-04-13 RX ADMIN — AMIODARONE HYDROCHLORIDE SCH MG: 200 TABLET ORAL at 09:04

## 2018-04-13 RX ADMIN — Medication SCH PATCH: at 09:06

## 2018-04-13 RX ADMIN — Medication SCH ML: at 20:34

## 2018-04-13 RX ADMIN — NICOTINE SCH MG: 21 PATCH TRANSDERMAL at 09:04

## 2018-04-13 RX ADMIN — AMOXICILLIN AND CLAVULANATE POTASSIUM SCH MG: 875; 125 TABLET, FILM COATED ORAL at 09:04

## 2018-04-13 RX ADMIN — CHOLECALCIFEROL TAB 25 MCG (1000 UNIT) SCH UNIT: 25 TAB at 09:04

## 2018-04-13 RX ADMIN — AMOXICILLIN AND CLAVULANATE POTASSIUM SCH MG: 875; 125 TABLET, FILM COATED ORAL at 20:32

## 2018-04-13 NOTE — P.DS
Admission Date: 04/07/18


Discharge Date: 04/13/18


Primary Care Provider: Dr. Mendez; Pain management-Dr. Ortiz


Disposition: DC HOME/HOME HEALTH CARE


Discharge Condition: GOOD


Reason for Admission: drug overdose


Consultations: 





Neurology-Dr. Monroy


Pulmonology-Dr. Nichole


Pain management-Dr. Ortiz


Procedures: 





MRI brain shows no acute stroke.  Chronic microvascular changes noted.





EEG unremarkable.








- Problems


(1) Hypothyroidism


Current Visit: Yes   Status: Chronic   


Qualifiers: 


   Hypothyroidism type: unspecified   Qualified Code(s): E03.9 - Hypothyroidism

, unspecified   





(2) Obesity


Current Visit: Yes   Status: Chronic   


Qualifiers: 


   Obesity type: due to excess calories   Obesity classification: adult class 1 

(BMI 30 - 34.9)   Serious obesity comorbidity presence: with serious 

comorbidity   Body mass index: BMI 31.0-31.9   Qualified Code(s): E66.09 - 

Other obesity due to excess calories; Z68.31 - Body mass index (BMI) 31.0-31.9, 

adult; Z68.31 - Body mass index (BMI) 31.0-31.9, adult   





(3) Back pain


Current Visit: Yes   Status: Chronic   


Qualifiers: 


   Back pain location: back pain in unspecified location   Chronicity: chronic 

  Back pain laterality: bilateral   Qualified Code(s): M54.9 - Dorsalgia, 

unspecified; G89.29 - Other chronic pain; G89.29 - Other chronic pain   





(4) Acute encephalopathy


Onset Date: 04/09/18   Current Visit: Yes   Status: Acute   





(5) Chronic pain syndrome


Onset Date: 04/09/18   Current Visit: Yes   Status: Chronic   





(6) Drug overdose


Onset Date: 04/09/18   Current Visit: Yes   Status: Acute   


Qualifiers: 


   Encounter type: initial encounter   Injury intent: undetermined intent   

Qualified Code(s): T50.904A - Poisoning by unspecified drugs, medicaments and 

biological substances, undetermined, initial encounter   





(7) Hyponatremia


Current Visit: Yes   Status: Acute   





(8) Tachycardia


Current Visit: Yes   Status: Acute   





(9) Hypertension


Current Visit: Yes   Status: Chronic   


Qualifiers: 


   Hypertension type: essential hypertension   Qualified Code(s): I10 - 

Essential (primary) hypertension   





(10) Hyperlipidemia


Current Visit: Yes   Status: Chronic   


Qualifiers: 


   Hyperlipidemia type: unspecified   Qualified Code(s): E78.5 - Hyperlipidemia

, unspecified   





(11) Vascular dementia


Current Visit: Yes   Status: Chronic   


Qualifiers: 


   Dementia behavioral disturbance: without behavioral disturbance   Qualified 

Code(s): F01.50 - Vascular dementia without behavioral disturbance   





(12) Pneumonia


Current Visit: Yes   Status: Acute   


Qualifiers: 


   Pneumonia type: due to unspecified organism   Laterality: left   Lung 

location: lower lobe of lung   Qualified Code(s): J18.1 - Lobar pneumonia, 

unspecified organism   





(13) Atrial fibrillation


Current Visit: Yes   Status: Chronic   


Qualifiers: 


   Atrial fibrillation type: chronic   Qualified Code(s): I48.2 - Chronic 

atrial fibrillation   





(14) Chronic anticoagulation


Current Visit: Yes   Status: Chronic   





(15) Obstructive sleep apnea


Current Visit: Yes   Status: Suspected   


Brief History of Present Illness: 





72-year-old  female presented to the emergency room with altered mental 

status.  The patient has a history of chronic back pain. Patient was found 

slumped over at home with multiple pills around her.  Pills included Dilaudid, 

Lipitor.  The patient was brought to the ER.  Patient was intubated to protect 

her airway.  Overdose of medication was suspected.


Hospital Course: 





Patient presented with acute encephalopathy likely related to overdose of pain 

medication.  During her stay, the patient was intubated to protect her airway.  

Overdose of pain medication was suspected.  The patient was eventually 

extubated.  After extubation the patient still was confused.  Pain Management 

was consulted to assess her pain pump as the patient was still having pain. 

Pain management refilled the pain pump.  Pain management reported that the 

patient had had a similar incident 1-2 years ago.  Pain management recommends 

that the patient no longer take Dilaudid or baclofen.  The patient was 

evaluated by neurology.  MRI and EEG was performed.  MRI showed no acute 

stroke.  Chronic microvascular changes noted. EEG was unremarkable.  Patient 

was evaluated by pulmonology due to her intubation.  She was found to have 

pneumonia.  This was treated.  During the course of her stay her condition 

improved.  Patient returned back to her mental baseline.  Neurology suspected 

that the patient may have underlying vascular dementia.  Physical therapy was 

able to work with patient.  The  was counseled about the need of 

providing medication for the patient instead of the patient give a herself her 

own medications due to her dementia.  This was addressed in detail with her PCP 

who agreed.  





During her stay she did work with physical therapy and was able to walk over 

200 feet with minimal assistance.  She was using a walker.  Family desires the 

patient to go to a skilled facility.  I explained to the  that this may 

be possible if the insurance approved.  Otherwise the patient will be 

discharged home with home health and physical therapy.  On fortunately patient 

was denied skilled placement due to her current physical status.  She did not 

meet criteria to go to a skilled facility.  This was explained in detail.  





At this time concerning her chronic pain, she will continue with her pain pump.

  This was refill by pain management while in the hospital.  Pain is well 

controlled.  Pain management also recommended to discontinue oral Dilaudid and 

baclofen.  Patient may take diclofenac 75 mg 1 pill twice daily as needed for 

pain. Lidocaine patch may be placed to the back for relief.  Recommendation is 

for the patient to follow up with pain management within 1-2 weeks to follow up 

this hospitalization and continue her care. Recommendation at this time is to 

be discharged home with home health and physical therapy.  Fall precautions 

will be provided.





Patient was found to have pneumonia.  This was treated during her stay.  This 

remained stable.  At discharge she was without any shortness of breath.  She 

was able to walk appropriately.  At discharge patient will continue with 

Augmentin 875 mg 1 pill twice daily for 7 days.  Recommendation is to recheck 

chest x-ray in 2-4 weeks to monitor resolution.  Recommendation is for the 

patient to follow up with pulmonology in 2-4 weeks to monitor her progress.





Patient with history of atrial fibrillation on chronic anti coagulation 

therapy.  This has remained stable.  Patient will continue with amiodarone 200 

mg 1 pill daily and Eliquis 5 mg 1 pill twice daily.  Recommendation is for the 

patient to follow up with her cardiologist to monitor her care.





Patient has hyperlipidemia.  She will continue with Lipitor 40 mg 1 pill once 

daily.





Patient has hypothyroidism.  She will continue with Levoxyl 25 mcg 1 pill once 

daily.  Further adjustment can be done by her PCP.





Patient has GERD.  Patient will continue with Protonix 40 mg 1 pill once daily.

  Prilosec has been discontinued.





Patient has anxiety.  This remained stable during her stay.  Patient may 

continue with Xanax 0.5 mg 1 pill daily as needed.





Patient with history of chronic pain. Patient continue with above 

recommendation.  At discharge baclofen and Dilaudid has been discontinued.  

Patient will follow up with pain management as an outpatient.





Patient will continue with vitamin-D supplementation.





Patient has vascular dementia.  Patient stable at this time.  Recommendations 

for the patient to follow up with neurology in 2-4 weeks to follow up this 

hospitalization.





At discharge patient will continue with home health and physical therapy.





 will need to distribute medication for patient due to her history of 

dementia.


Vital Signs/Physical Exam: 














Temp Pulse Resp BP Pulse Ox


 


 97.4 F   73   20   122/64   95 


 


 04/13/18 16:00  04/13/18 16:00  04/13/18 16:00  04/13/18 16:00  04/13/18 16:00








General: Alert, In no apparent distress, Cooperative


HEENT: Atraumatic, Mucous membr. moist/pink


Neck: Supple


Respiratory: Clear to auscultation bilaterally, Normal air movement


Cardiovascular: Normal pulses, Regular rate/rhythm


Gastrointestinal: Normal bowel sounds, Soft and benign, Non-distended, No 

tenderness, No masses, No rebound, No guarding


Musculoskeletal: No erythema, No tenderness, No warmth


Integumentary: No tenderness/swelling, No erythema, No warmth, No cyanosis


Neurological: Normal speech, Normal strength at 5/5 x4 extr, Normal tone, 

Normal affect


Laboratory Data at Discharge: 














WBC  8.2 K/uL (4.3-10.9)   04/13/18  04:08    


 


Hgb  11.6 g/dL (12.0-15.0)  L  04/13/18  04:08    


 


Hct  35.3 % (36.0-45.0)  L  04/13/18  04:08    


 


Plt Count  188 K/uL (152-406)   04/13/18  04:08    


 


PT  11.4 SECONDS (9.5-12.5)   04/07/18  20:40    


 


INR  0.97   04/07/18  20:40    


 


APTT  19.1 SECONDS (24.3-36.9)  L  04/07/18  20:40    


 


Sodium  137 mEq/L (135-145)   04/13/18  04:08    


 


Potassium  4.2 mEq/L (3.6-5.0)   04/13/18  04:08    


 


BUN  10 mg/dL (6-20)   04/13/18  04:08    


 


Creatinine  0.48 mg/dL (0.44-1.00)   04/13/18  04:08    


 


Glucose  95 mg/dL ()   04/13/18  04:08    


 


Phosphorus  4.0 mg/dL (2.5-4.3)   04/08/18  05:18    


 


Magnesium  1.9 mg/dL (1.8-2.5)   04/13/18  04:08    


 


Total Bilirubin  1.4 mg/dL (0.3-1.2)  H  04/09/18  04:45    


 


AST  202 IU/L (10-42)  H D 04/09/18  04:45    


 


ALT  305 IU/L (10-60)  H* D 04/09/18  04:45    


 


Alkaline Phosphatase  127 IU/L ()  H  04/09/18  04:45    


 


Troponin I  < 0.03 ng/mL (<0.03)   04/08/18  15:28    


 


B-Natriuretic Peptide  73 pg/ml (<=100)   04/07/18  20:40    








Home Medications: 








Alprazolam [Xanax*] 0.5 mg PO DAILY 04/08/18 


Amiodarone HCl [Cordarone*] 200 mg PO DAILY 04/08/18 


Apixaban [Eliquis *] 5 mg PO BID 04/08/18 


Atorvastatin Calcium [Lipitor] 40 mg PO BEDTIME 04/08/18 


Cholecalciferol (Vitamin D3) [Vitamin D3] 2,000 unit PO BID 04/08/18 


Diclofenac Sodium [Voltaren] 75 mg PO BID 04/08/18 


Fluticasone [Flonase 50MCG Nasal Spray*] 2 sprays NS DAILY 04/08/18 


Levothyroxine Sodium [Unithroid] 25 mcg PO DAILY 04/08/18 


Amox/Clavulanate [Augmentin 875-125 Tab*] 875 mg PO BID #14 tab 04/13/18 


Lidocaine 5% Patch [Lidoderm 5% Patch*] 1 patch TOP DAILY #30 patch 04/13/18 


Nicotine [Nicoderm*] 21 mg TD DAILY #30 patch.td24 04/13/18 


Pantoprazole [Protonix Tab*] 40 mg PO DAILYAC #30 tab 04/13/18 





New Medications: 


Amox/Clavulanate [Augmentin 875-125 Tab*] 875 mg PO BID #14 tab


Lidocaine 5% Patch [Lidoderm 5% Patch*] 1 patch TOP DAILY #30 patch


Nicotine [Nicoderm*] 21 mg TD DAILY #30 patch.td24


Pantoprazole [Protonix Tab*] 40 mg PO DAILYAC #30 tab


Patient Discharge Instructions: 1.  She will need a follow up with her PCP 

within 1 week to follow up this hospitalization.  2.  Patient presented with 

acute encephalopathy likely related to overdose of pain medication.  This has 

resolved.  Pain management has refilled pain pump.  Recommendation is to 

discontinue oral Dilaudid and baclofen.  Patient may take diclofenac 75 mg 1 

pill twice daily as needed for pain. Lidocaine patch may be placed to the back 

for relief.  Recommendation is for the patient to follow up with pain 

management within 1-2 weeks to follow up this hospitalization and continue her 

care.  Patient was evaluated for skilled placement.  Patient was denied for 

skilled placement by insurance.  At this time patient is able to walk 

appropriately more than 200 feet with minimal assistance.  Patient using walker 

at this time.  Fall precautions will be provided.  Recommendation at this time 

is to be discharged home with home health and physical therapy.  3.  Patient 

was found to have pneumonia.  Patient stable at this time.  No need for oxygen 

at discharge. At discharge patient will continue with Augmentin 875 mg 1 pill 

twice daily for 7 days.  Recommendation is to recheck chest x-ray in 2-4 weeks 

to monitor resolution.  Recommendation is for the patient to follow up with 

pulmonology in 2-4 weeks to monitor her progress.  4.  Patient with history of 

atrial fibrillation on chronic anti coagulation therapy.  This has remained 

stable.  Patient will continue with amiodarone 200 mg 1 pill daily and Eliquis 

5 mg 1 pill twice daily.  Recommendation is for the patient to follow up with 

her cardiologist to monitor her care.  5.  Patient has hyperlipidemia.  She 

will continue with Lipitor 40 mg 1 pill once daily.  6.  Patient has 

hypothyroidism.  She will continue with Levoxyl 25 mcg 1 pill once daily.  

Further adjustment can be done by her PCP.  7.  Patient has GERD.  Patient will 

continue with Protonix 40 mg 1 pill once daily.  Prilosec has been 

discontinued.  8.  Patient has anxiety.  Patient may continue with Xanax 0.5 mg 

1 pill daily as needed.  9.  Patient with history of chronic pain. Patient 

continue with above recommendation.  At discharge baclofen and Dilaudid has 

been discontinued.  Patient will follow up with pain management as an 

outpatient.  10.  Patient will continue with vitamin-D supplementation.  11.  

Patient has vascular dementia.  Patient stable at this time.  Recommendations 

for the patient to follow up with neurology in 2-4 weeks to follow up this 

hospitalization.  12.  At discharge patient will continue with home health and 

physical therapy.  13.   will need to distribute medication for patient 

due to her history of dementia.


Diet: AHA


Activity: Fall precautions


Time spent managing pt's care (in minutes): 60

## 2018-04-13 NOTE — P.PN
Subjective


Date of Service: 04/13/18


Primary Care Provider: Dr. Mendez; Pain management-Dr. Ortiz


Chief Complaint: drug overdose


Subjective: Doing well





Physical Examination





- Vital Signs


Temperature: 97.3 F


Blood Pressure: 127/65


Pulse: 86


Respirations: 20


Pulse Ox (%): 92





- Physical Exam


General: Alert, In no apparent distress, Cooperative


HEENT: Atraumatic


Neck: Supple


Respiratory: Clear to auscultation bilaterally, Normal air movement


Cardiovascular: Normal pulses, Regular rate/rhythm


Gastrointestinal: Normal bowel sounds, Soft and benign, Non-distended, No 

tenderness, No masses, No rebound, No guarding


Musculoskeletal: No erythema, No tenderness, No warmth


Integumentary: No tenderness/swelling, No erythema, No warmth, No cyanosis


Neurological: Normal speech, Normal strength at 5/5 x4 extr, Normal tone, 

Normal affect





- Studies


Medications List Reviewed: Yes





Assessment & Plan





- Problems (Diagnosis)


(1) Hypothyroidism


Current Visit: Yes   Status: Chronic   


Plan: 


Will continue with medication.  Patient doing well at this time.  Awaiting 

approval for skilled placement.  Patient would benefit with skilled placement 

due to her chronic back pain and mild instability with ambulation.  If not 

approved by insurance then the patient will go home with home health and 

physical therapy.


Qualifiers: 


   Hypothyroidism type: unspecified   Qualified Code(s): E03.9 - Hypothyroidism

, unspecified   





(2) Obesity


Current Visit: Yes   Status: Chronic   


Plan: 


Will continue to address lifestyle modification education


Qualifiers: 


   Obesity type: due to excess calories   Obesity classification: adult class 1 

(BMI 30 - 34.9)   Serious obesity comorbidity presence: with serious 

comorbidity   Body mass index: BMI 31.0-31.9   Qualified Code(s): E66.09 - 

Other obesity due to excess calories; Z68.31 - Body mass index (BMI) 31.0-31.9, 

adult; Z68.31 - Body mass index (BMI) 31.0-31.9, adult   





(3) Back pain


Current Visit: Yes   Status: Chronic   


Plan: 


Her pain specialist did come by to refill the pain pump.  It was time to refill 

it.  Pain pump appears to be working appropriately.  Pain well controlled.  

Awaiting skilled placement approval.  Pain management recommends that she no 

longer use oral pain medication or muscle relaxer.  Will continue with above 

plan of care. 


Qualifiers: 


   Back pain location: back pain in unspecified location   Chronicity: chronic 

  Back pain laterality: bilateral   Qualified Code(s): M54.9 - Dorsalgia, 

unspecified; G89.29 - Other chronic pain; G89.29 - Other chronic pain   





(4) Acute encephalopathy


Onset Date: 04/09/18   Current Visit: Yes   Status: Acute   


Plan: 


This is likely multifactorial.  This may be related to underlying pneumonia and 

overmedication of pain medication.  UTI  ruled out.  Patient much improved.  

MRI brain showed no acute stroke.  Chronic ischemic changes noted. Patient 

likely with vascular dementia as noted by neurology.  This has resolved.  Await 

skilled placement approval.








(5) Chronic pain syndrome


Onset Date: 04/09/18   Current Visit: Yes   Status: Chronic   


Plan: 


Pain management refilled pain pump.  Pain pump is working appropriately.  Pain 

management recommends no further use of oral pain medication at discharge.








(6) Drug overdose


Onset Date: 04/09/18   Current Visit: Yes   Status: Acute   


Plan: 


Continue with above plan of care.


Qualifiers: 


   Encounter type: initial encounter   Injury intent: undetermined intent   

Qualified Code(s): T50.904A - Poisoning by unspecified drugs, medicaments and 

biological substances, undetermined, initial encounter   





(7) Hyponatremia


Current Visit: Yes   Status: Acute   


Plan: 


Will continue with IV fluids.  Will monitor and adjust appropriately.








(8) Tachycardia


Current Visit: Yes   Status: Acute   


Plan: 


This has resolved.  Will monitor closely.








(9) Hypertension


Current Visit: Yes   Status: Chronic   


Plan: 


Blood pressure stable


Qualifiers: 


   Hypertension type: essential hypertension   Qualified Code(s): I10 - 

Essential (primary) hypertension   





(10) Hyperlipidemia


Current Visit: Yes   Status: Chronic   


Plan: 


Will hold medication at this time.  Will restarted discharge


Qualifiers: 


   Hyperlipidemia type: unspecified   Qualified Code(s): E78.5 - Hyperlipidemia

, unspecified   





(11) Vascular dementia


Current Visit: Yes   Status: Chronic   


Plan: 


Patient with underlying vascular dementia.  MRI shows no acute stroke but 

chronic ischemic changes noted. Case discussed with nephrology.  Nephrology 

agrees with current plan of care.  Awaiting skilled placement approval.


Qualifiers: 


   Dementia behavioral disturbance: without behavioral disturbance   Qualified 

Code(s): F01.50 - Vascular dementia without behavioral disturbance   





(12) Pneumonia


Current Visit: Yes   Status: Acute   


Plan: 


Possible pneumonia noted. Will continue with antibiotic therapy.  Patient not 

requiring any oxygen.


Qualifiers: 


   Pneumonia type: due to unspecified organism   Laterality: left   Lung 

location: lower lobe of lung   Qualified Code(s): J18.1 - Lobar pneumonia, 

unspecified organism   





(13) Atrial fibrillation


Current Visit: Yes   Status: Chronic   


Plan: 


Will continue with her rate control medication.  Patient on chronic anti 

coagulation therapy.


Qualifiers: 


   Atrial fibrillation type: chronic   Qualified Code(s): I48.2 - Chronic 

atrial fibrillation   





(14) Chronic anticoagulation


Current Visit: Yes   Status: Chronic   


Plan: 


Continue with chronic anti coagulation therapy.





Discharge Plan: Other (Skilled placement)


Plan to discharge in: 24 Hours


Time Spent Managing Pts Care (In Minutes): 55

## 2018-04-14 VITALS — TEMPERATURE: 96.9 F | DIASTOLIC BLOOD PRESSURE: 61 MMHG | SYSTOLIC BLOOD PRESSURE: 149 MMHG

## 2018-04-14 VITALS — OXYGEN SATURATION: 96 %

## 2018-04-14 RX ADMIN — LEVOTHYROXINE SODIUM SCH MG: 25 TABLET ORAL at 05:44

## 2018-04-14 RX ADMIN — AMIODARONE HYDROCHLORIDE SCH MG: 200 TABLET ORAL at 08:53

## 2018-04-14 RX ADMIN — Medication SCH ML: at 08:55

## 2018-04-14 RX ADMIN — AMOXICILLIN AND CLAVULANATE POTASSIUM SCH MG: 875; 125 TABLET, FILM COATED ORAL at 20:14

## 2018-04-14 RX ADMIN — CHOLECALCIFEROL TAB 25 MCG (1000 UNIT) SCH UNIT: 25 TAB at 20:14

## 2018-04-14 RX ADMIN — AMOXICILLIN AND CLAVULANATE POTASSIUM SCH MG: 875; 125 TABLET, FILM COATED ORAL at 08:54

## 2018-04-14 RX ADMIN — ALPRAZOLAM SCH MG: 0.5 TABLET ORAL at 08:53

## 2018-04-14 RX ADMIN — APIXABAN SCH MG: 5 TABLET, FILM COATED ORAL at 20:14

## 2018-04-14 RX ADMIN — DICLOFENAC SODIUM SCH MG: 75 TABLET, DELAYED RELEASE ORAL at 08:55

## 2018-04-14 RX ADMIN — DICLOFENAC SODIUM SCH MG: 75 TABLET, DELAYED RELEASE ORAL at 20:14

## 2018-04-14 RX ADMIN — APIXABAN SCH MG: 5 TABLET, FILM COATED ORAL at 08:53

## 2018-04-14 RX ADMIN — NICOTINE SCH MG: 21 PATCH TRANSDERMAL at 08:53

## 2018-04-14 RX ADMIN — Medication SCH PATCH: at 08:54

## 2018-04-14 RX ADMIN — CHOLECALCIFEROL TAB 25 MCG (1000 UNIT) SCH UNIT: 25 TAB at 08:52

## 2018-04-14 RX ADMIN — ATORVASTATIN CALCIUM SCH MG: 40 TABLET, FILM COATED ORAL at 20:14

## 2018-04-14 RX ADMIN — PANTOPRAZOLE SODIUM SCH MG: 40 TABLET, DELAYED RELEASE ORAL at 05:43

## 2018-04-14 NOTE — PN
Date of Progress Note:  04/14/2018



Subjective:  The patient was seen and examined, chart reviewed, and case discussed with RN.  The bri
ent states, she feels fine.  No complaints.  Slept well overnight.  The patient was discharged yester
day.  However, family has appealed to Medicare regarding discharge.  The patient states that, she is 
able to ambulate well without assist.  No difficulty taking p.o. intake and no problems to go to the 
bathroom.  Unfortunately, the patient was unable to be accepted for skilled placement.  She does not 
qualify.



Review of Systems:

Negative except as above.



Medications:  Reviewed.



Physical Examination:

Vital Signs:  Temperature 98, heart rate 90, blood pressure 143/64, respirations 16, and O2 91% on ro
om air. 

General:  Awake, alert, oriented x3.  No acute distress elderly female. 

CV:  S1, S2.  No murmurs.  Regular rate and rhythm.  Peripheral pulses present. 

Respiratory:  Moving air well bilaterally.  No wheezing.  No stridor. 

Gastrointestinal:  Abdomen is soft, nontender, nondistended.  Positive bowel sounds. 

Extremities:  No clubbing, cyanosis, edema. 

Neurologic:  Nonfocal.



Laboratory Data:  Sodium 137, potassium 4.2, chloride 104, CO2 30, BUN 10, creatinine 0.48, glucose 9
5, calcium 8.6, and magnesium 1.9.  These labs are from 04/13/2018.  Urine culture shows no growth.  
Blood culture, no growth to date.  Sputum culture canceled.



Assessment:  A 72-year-old female with;

1.Hypothyroidism.  We will continue replacement.

2.Obesity, class 1, due to excess calories.  Counseled.

3.Back pain, chronic, midline, without sciatica.  The patient has a pain pump, which has been refill
ed recently.  Pain Management recommends that, she no longer use oral pain medication or muscle relax
er.

4.Acute metabolic encephalopathy, resolved, may have been due to overmedication of her pain medicati
on, possible infection.  MRI was done, which showed no acute stroke.

5.Chronic pain syndrome, on pain pump.

6.Drug overdose.

7.Hyponatremia, resolved.

8.Tachycardia, resolved.

9.Essential hypertension.

10.Hyperlipidemia.

11.Vascular dementia.  MRI negative for acute stroke.  Did show chronic ischemic changes.  Neurology
 on board.

12.Pneumonia, possible left lower lobe.  Not requiring any supplemental oxygen.  No hypoxia.  Treate
d with antibiotics.  Cultures show coagulase-negative Staphylococcus.

13.Atrial fibrillation, rate controlled.  The patient on chronic anticoagulation therapy.



Plan:  We will discuss further with social work.  The patient currently has appealed discharge.





/LISA

DD:  04/14/2018 11:04:16Voice ID:  [id number]

DT:  04/14/2018 13:53:04Report ID:  [id number]

## 2018-04-14 NOTE — P.PN
Date of Service: 04/14/18





I was informed that family changed their mind and wants to withdraw the 

discharge appeal. They would like to take the patient home tonight. Mr silvestre 

is clinically and hemodynamically stable to be discharged, will continue 

working with social service to set up home health. Please for refer to Dr Torres summary discharge for hospital course details, and instruction orders. 

Will order to remove PICC line before go home.

## 2018-05-18 ENCOUNTER — HOSPITAL ENCOUNTER (EMERGENCY)
Dept: HOSPITAL 97 - ER | Age: 73
Discharge: HOME | End: 2018-05-18
Payer: COMMERCIAL

## 2018-05-18 VITALS — TEMPERATURE: 97.7 F

## 2018-05-18 VITALS — DIASTOLIC BLOOD PRESSURE: 70 MMHG | SYSTOLIC BLOOD PRESSURE: 167 MMHG | OXYGEN SATURATION: 98 %

## 2018-05-18 DIAGNOSIS — E03.9: ICD-10-CM

## 2018-05-18 DIAGNOSIS — G89.29: Primary | ICD-10-CM

## 2018-05-18 DIAGNOSIS — E78.00: ICD-10-CM

## 2018-05-18 DIAGNOSIS — Z88.2: ICD-10-CM

## 2018-05-18 PROCEDURE — 99283 EMERGENCY DEPT VISIT LOW MDM: CPT

## 2018-05-18 NOTE — ER
Nurse's Notes                                                                                     

 [facility]                                                                

Name: Olga Martinez                                                                              

Age: 73 yrs                                                                                       

Sex: Female                                                                                       

: 1945                                                                                   

MRN: [id number]                                                                                   

Arrival Date: 2018                                                                          

Time: 00:18                                                                                       

Account#: [id number]                                                                            

Bed 18                                                                                            

Private MD:                                                                                       

Diagnosis: Chronic pain, not elsewhere classified;Low back pain                                   

                                                                                                  

Presentation:                                                                                     

                                                                                             

00:38 Presenting complaint: Patient states: She has been having lower back pain and right leg ea  

      pain. Patient reports she called Dr. Schilling and was told to come in if the pain became     

      intolerable. States " It hurts so bad I can't walk". Transition of care: patient was        

      not received from another setting of care. Onset of symptoms was May 18, 2018. Initial      

      Sepsis Screen: Does the patient meet any 2 criteria? No. Patient's initial sepsis           

      screen is negative. Does the patient have a suspected source of infection? No.              

      Patient's initial sepsis screen is negative. Care prior to arrival: None.                   

00:38 Method Of Arrival: Wheelchair                                                           ea  

00:38 Acuity: DIMA 3                                                                           ea  

                                                                                                  

Triage Assessment:                                                                                

01:14 General: Appears in no apparent distress. Behavior is calm, cooperative. General:       ea  

      General: Pt reports she had seen Dr. Day yesterday and he prescribed a steroid. .      

      Pain: Complains of pain in lumbar area, left low back and right low back Pain radiates      

      to right leg Pain currently is 10 out of 10 on a pain scale. Quality of pain is             

      described as sharp, Pain began 2-3 days ago. Is continuous. Musculoskeletal:                

      Circulation, motion, and sensation intact.                                                  

01:14 EENT: No signs and/or symptoms were reported regarding the EENT system. Neuro: Level of ea  

      Consciousness is awake, alert, obeys commands, Oriented to person, place, time,             

      situation. Cardiovascular: Heart tones S1 S2 present Patient's skin is warm and dry.        

      Respiratory: Airway is patent Respiratory effort is even, unlabored, Respiratory            

      pattern is regular, symmetrical, Breath sounds are clear bilaterally. GI: No signs          

      and/or symptoms were reported involving the gastrointestinal system. : No signs           

      and/or symptoms were reported regarding the genitourinary system. Derm: Skin is pink,       

      warm \T\ dry.                                                                               

                                                                                                  

Historical:                                                                                       

- Allergies:                                                                                      

01:02 Sulfa (Sulfonamide Antibiotics);                                                        ea  

- Home Meds:                                                                                      

01:02 amiodarone 200 mg Oral tab once daily [Active]; Vitamin D Oral 2000 unit twice a day    ea  

      [Active]; atorvastatin 40 mg Oral tab 1 tab once daily [Active]; Flonase 50                 

      mcg/actuation Nasal spsn [Active]; diclofenac sodium 75 mg Oral TbEC 1 tab 2 times per      

      day [Active]; levothyroxine 25 mcg tab once daily [Active]; omeprazole 20 mg Oral TbEC      

      20 mg [Active]; Eliquis 5 mg Oral tab 2 times per day [Active];                             

- PMHx:                                                                                           

01:02 cardiiac arrythmia; Chronic pain; Dementia; High Cholesterol; Hypothyroidism;           ea  

                                                                                                  

- Immunization history:: Adult Immunizations up to date.                                          

- Social history:: Smoking status: Patient/guardian denies using tobacco, but has a               

  distant history of tobacco abuse.                                                               

- Family history:: not pertinent.                                                                 

- Hospitalizations: : No recent hospitalization is reported.                                      

- History obtained from: .                                                                 

                                                                                                  

                                                                                                  

Screenin:21 Abuse screen: Denies threats or abuse. Nutritional screening: No deficits noted.        ea  

      Tuberculosis screening: No symptoms or risk factors identified. Fall Risk None              

      identified.                                                                                 

                                                                                                  

Assessment:                                                                                       

01:30 Reassessment: Patient and/or family updated on plan of care and expected duration. Pain ea  

      level reassessed. Patient is alert, oriented x 3, equal unlabored respirations, skin        

      warm/dry/pink. Neuro: No deficits noted.                                                    

01:30 Neuro: Level of Consciousness is awake, alert, obeys commands, Oriented to person,      ea  

      place, time, situation, Speech is normal.                                                   

02:50 Reassessment: Patient and/or family updated on plan of care and expected duration. Pain ea  

      level reassessed. Patient is alert, oriented x 3, equal unlabored respirations, skin        

      warm/dry/pink. Discharge instructions given to patient, verbalized the understanding of     

      instruction.                                                                                

                                                                                                  

Vital Signs:                                                                                      

00:37  / 74; Pulse 79; Resp 20; Temp 97.7(O); Pulse Ox 96% on R/A; Weight 90.72 kg;     ea  

      Height 5 ft. 9 in. (175.26 cm); Pain 10/10;                                                 

01:00  / 60; Pulse 82; Resp 18 S; Pulse Ox 97% on R/A; Pain 9/10;                       ea  

02:51  / 70; Pulse 80; Resp 18; Pulse Ox 98% on R/A; Pain 9/10;                         ea  

00:37 Body Mass Index 29.53 (90.72 kg, 175.26 cm)                                             ea  

                                                                                                  

ED Course:                                                                                        

00:18 Patient arrived in ED.                                                                  ds1 

00:48 Monica Hwang RN is Primary Nurse.                                                    ea  

00:55 Kimberly Carreon FNP is PHCP.                                                           kav 

00:55 Benny Scott MD is Attending Physician.                                             kav 

00:59 Triage completed.                                                                       ea  

01:00 Arm band placed on right wrist. Patient placed in an exam room, on a stretcher, on      ea  

      pulse oximetry.                                                                             

01:00 Patient has correct armband on for positive identification. Bed in low position. Call   ea  

      light in reach. Side rails up X2.                                                           

02:48 No provider procedures requiring assistance completed. Patient did not have IV access   ea  

      during this emergency room visit.                                                           

                                                                                                  

Administered Medications:                                                                         

02:00 Drug: Norco 10 mg-325 mg 1 tabs Route: PO;                                              ea  

03:00 Follow up: Response: No adverse reaction                                                ea  

02:39 Drug: Norco (7.5 mg-325 mg) 1 tabs Route: PO;                                           ea  

02:45 Follow up: Response: No adverse reaction                                                ea  

                                                                                                  

                                                                                                  

Outcome:                                                                                          

02:13 Discharge ordered by MD.                                                                kav 

02:49 Discharged to home via wheelchair, with family.                                         ea  

02:49 Condition: improved                                                                         

02:49 Discharge instructions given to patient, Instructed on discharge instructions, follow       

      up and referral plans. medication usage, Demonstrated understanding of instructions,        

      follow-up care, medications, Prescriptions given X 1.                                       

02:59 Patient left the ED.                                                                    ea  

                                                                                                  

Signatures:                                                                                       

Kimberly Carreon FNP FNP  [facility]                                                  

Ana María Hansen                                ds1                                                  

Monica Hwang RN                      RN   ea                                                   

                                                                                                  

Corrections: (The following items were deleted from the chart)                                    

00:59 00:49 Presenting complaint: ea                                                          ea  

01:30 01:14 General: ea                                                                       ea  

02:51 01:30 Reassessment: Patient and/or family updated on plan of care and expected          ea  

      duration. Pain level reassessed. Patient is alert, oriented x 3, equal unlabored            

      respirations, skin warm/dry/pink. ea                                                        

                                                                                                  

**************************************************************************************************

## 2018-05-18 NOTE — EDPHYS
Physician Documentation                                                                           

 [facility]                                                                

Name: Olga Martinez                                                                              

Age: 73 yrs                                                                                       

Sex: Female                                                                                       

: 1945                                                                                   

MRN: [id number]                                                                                   

Arrival Date: 2018                                                                          

Time: 00:18                                                                                       

Account#: [id number]                                                                            

Bed 18                                                                                            

Private MD:                                                                                       

Benny Snowden                                                                      

HPI:                                                                                              

                                                                                             

00:55 This 73 yrs old  Female presents to ER via [facility] with complaints of Back  kav 

      Pain, Leg Pain.                                                                             

01:46 The patient presents with pain that is chronic. The symptoms are located in the low     kav 

      back, L2, L3, L4 and L5. Onset: The symptoms/episode began/occurred acutely. The pain       

      radiates to the right quadriceps. Associated signs and symptoms: Pertinent positives:       

      weakness, Pertinent negatives: constipation, dysuria, fever, hematuria, incontinence,       

      nausea, numbness, tingling, urinary retention, vomiting. The problem was sustained from     

      a chronic condition, the patient has known disc disease, the patient has had previous       

      back surgery. Modifying factors: The patient symptoms are alleviated by pain pump/pain      

      management/dr. sheriff. Severity of symptoms: At their worst the symptoms were             

      moderate, just prior to arrival. The patient has experienced similar episodes in the        

      past, chronically. The patient has been recently seen by a physician: Dr. alberto. .       

02:30 pt is currently taking a medrol dose pack prescribed by dr. ferro/ortho on 18.  ka 

                                                                                                  

Historical:                                                                                       

- Allergies:                                                                                      

01:02 Sulfa (Sulfonamide Antibiotics);                                                        ea  

- Home Meds:                                                                                      

01:02 amiodarone 200 mg Oral tab once daily [Active]; Vitamin D Oral 2000 unit twice a day    ea  

      [Active]; atorvastatin 40 mg Oral tab 1 tab once daily [Active]; Flonase 50                 

      mcg/actuation Nasal spsn [Active]; diclofenac sodium 75 mg Oral TbEC 1 tab 2 times per      

      day [Active]; levothyroxine 25 mcg tab once daily [Active]; omeprazole 20 mg Oral TbEC      

      20 mg [Active]; Eliquis 5 mg Oral tab 2 times per day [Active];                             

- PMHx:                                                                                           

01:02 cardiiac arrythmia; Chronic pain; Dementia; High Cholesterol; Hypothyroidism;           ea  

                                                                                                  

- Immunization history:: Adult Immunizations up to date.                                          

- Social history:: Smoking status: Patient/guardian denies using tobacco, but has a               

  distant history of tobacco abuse.                                                               

- Family history:: not pertinent.                                                                 

- Hospitalizations: : No recent hospitalization is reported.                                      

- History obtained from: .                                                                 

                                                                                                  

                                                                                                  

ROS:                                                                                              

01:49 Constitutional: Negative for fever, chills, and weight loss, Eyes: Negative for injury, kav 

      pain, redness, and discharge, ENT: Negative for injury, pain, and discharge, Neck:          

      Negative for injury, pain, and swelling, Cardiovascular: Negative for chest pain,           

      palpitations, and edema, Respiratory: Negative for shortness of breath, cough,              

      wheezing, and pleuritic chest pain, Abdomen/GI: Negative for abdominal pain, nausea,        

      vomiting, diarrhea, and constipation, : Negative for injury, bleeding, discharge, and     

      swelling, MS/Extremity: Negative for injury and deformity, Skin: Negative for injury,       

      rash, and discoloration, Neuro: Negative for headache, weakness, numbness, tingling,        

      and seizure, Psych: Negative for depression, anxiety, suicide ideation, homicidal           

      ideation, and hallucinations, Allergy/Immunology: Negative for hives, rash, and             

      allergies, Endocrine: Negative for neck swelling, polydipsia, polyuria, polyphagia, and     

      marked weight changes, Hematologic/Lymphatic: Negative for swollen nodes, abnormal          

      bleeding, and unusual bruising.                                                             

01:49 Back: Positive for pain at rest, pain with movement.                                        

                                                                                                  

Exam:                                                                                             

01:49 Constitutional:  This is a well developed, well nourished patient who is awake, alert,  kav 

      and in no acute distress. Head/Face:  Normocephalic, atraumatic. Eyes:  Pupils equal        

      round and reactive to light, extra-ocular motions intact.  Lids and lashes normal.          

      Conjunctiva and sclera are non-icteric and not injected.  Cornea within normal limits.      

      Periorbital areas with no swelling, redness, or edema. ENT:  Nares patent. No nasal         

      discharge, no septal abnormalities noted.  Tympanic membranes are normal and external       

      auditory canals are clear.  Oropharynx with no redness, swelling, or masses, exudates,      

      or evidence of obstruction, uvula midline.  Mucous membranes moist. Neck:  Trachea          

      midline, no thyromegaly or masses palpated, and no cervical lymphadenopathy.  Supple,       

      full range of motion without nuchal rigidity, or vertebral point tenderness.  No            

      Meningismus. Chest/axilla:  Normal chest wall appearance and motion.  Nontender with no     

      deformity.  No lesions are appreciated. Cardiovascular:  Regular rate and rhythm with a     

      normal S1 and S2.  No gallops, murmurs, or rubs.  Normal PMI, no JVD.  No pulse             

      deficits. Respiratory:  Lungs have equal breath sounds bilaterally, clear to                

      auscultation and percussion.  No rales, rhonchi or wheezes noted.  No increased work of     

      breathing, no retractions or nasal flaring. Abdomen/GI:  Soft, non-tender, with normal      

      bowel sounds.  No distension or tympany.  No guarding or rebound.  No evidence of           

      tenderness throughout. Female :  Normal external genitalia. Skin:  Warm, dry with         

      normal turgor.  Normal color with no rashes, no lesions, and no evidence of cellulitis.     

      MS/ Extremity:  Pulses equal, no cyanosis.  Neurovascular intact.  Full, normal range       

      of motion. Neuro:  Awake and alert, GCS 15, oriented to person, place, time, and            

      situation.  Cranial nerves II-XII grossly intact.  Motor strength 5/5 in all                

      extremities.  Sensory grossly intact.  Cerebellar exam normal.  Normal gait. Psych:         

      Awake, alert, with orientation to person, place and time.  Behavior, mood, and affect       

      are within normal limits.                                                                   

01:49 Back: pain, that is moderate, of the  lumbar area and sacrum, ROM is painful, normal        

      spinal alignment noted, CVA tenderness, is absent, vertebral tenderness, is not             

      appreciated.                                                                                

                                                                                                  

Vital Signs:                                                                                      

00:37  / 74; Pulse 79; Resp 20; Temp 97.7(O); Pulse Ox 96% on R/A; Weight 90.72 kg;     ea  

      Height 5 ft. 9 in. (175.26 cm); Pain 10/10;                                                 

01:00  / 60; Pulse 82; Resp 18 S; Pulse Ox 97% on R/A; Pain 9/10;                       ea  

02:51  / 70; Pulse 80; Resp 18; Pulse Ox 98% on R/A; Pain 9/10;                         ea  

00:37 Body Mass Index 29.53 (90.72 kg, 175.26 cm)                                             ea  

                                                                                                  

MDM:                                                                                              

00:55 Medical screening is not applicable.                                                    [facility] 

01:49 Data reviewed: vital signs, nurses notes.                                               kav 

                                                                                                  

Administered Medications:                                                                         

02:00 Drug: Norco 10 mg-325 mg 1 tabs Route: PO;                                              ea  

03:00 Follow up: Response: No adverse reaction                                                ea  

02:39 Drug: Norco (7.5 mg-325 mg) 1 tabs Route: PO;                                           ea  

02:45 Follow up: Response: No adverse reaction                                                ea  

                                                                                                  

                                                                                                  

Disposition:                                                                                      

06:38 Co-signature as Attending Physician, Benny Scott MD I agree with the assessment and  [facility] 

      plan of care.                                                                               

                                                                                                  

Disposition:                                                                                      

18 02:13 Discharged to Home. Impression: Chronic pain, not elsewhere classified, Low        

  back pain.                                                                                      

- Condition is Stable.                                                                            

- Discharge Instructions: Chronic Back Pain, Heat Therapy.                                        

- Prescriptions for Tramadol 50 mg Oral Tablet - take 1 tablet by ORAL route every 8              

  hours as needed; 30 tablet.                                                                     

- Medication Reconciliation Form, Thank You Letter, Antibiotic Education, Prescription            

  Opioid Use form.                                                                                

- Follow up: Private Physician; When: 5 - 6 days; Reason: Recheck today's complaints,             

  Continuance of care, Re-evaluation by your physician.                                           

- Problem is chronic.                                                                             

- Symptoms have improved.                                                                         

                                                                                                  

                                                                                                  

                                                                                                  

Signatures:                                                                                       

Benny Scott, Kimberly Ramírez MD, cha, FNP                    FNP  Monica Odonnell, RN                      RN   ant                                                   

                                                                                                  

Corrections: (The following items were deleted from the chart)                                    

02:59 02:13 2018 02:13 Discharged to Home. Impression: Chronic pain, not elsewhere      ea  

      classified; Low back pain. Condition is Stable. Discharge Instructions: Chronic Back        

      Pain, Heat Therapy. Forms are Medication Reconciliation Form, Thank You Letter,             

      Antibiotic Education, Prescription Opioid Use. Follow up: Private Physician; When: 5 -      

      6 days; Reason: Recheck today's complaints, Continuance of care, Re-evaluation by your      

      physician. Problem is chronic. Symptoms have improved. arielle                                  

                                                                                                  

**************************************************************************************************

## 2018-05-19 ENCOUNTER — HOSPITAL ENCOUNTER (OUTPATIENT)
Dept: HOSPITAL 97 - ER | Age: 73
Setting detail: OBSERVATION
LOS: 6 days | Discharge: NURSING, REHAB OR LONG-TERM CARE | End: 2018-05-25
Attending: FAMILY MEDICINE | Admitting: FAMILY MEDICINE
Payer: COMMERCIAL

## 2018-05-19 VITALS — BODY MASS INDEX: 27.8 KG/M2

## 2018-05-19 DIAGNOSIS — F41.9: ICD-10-CM

## 2018-05-19 DIAGNOSIS — E87.6: ICD-10-CM

## 2018-05-19 DIAGNOSIS — E66.9: ICD-10-CM

## 2018-05-19 DIAGNOSIS — Z91.81: ICD-10-CM

## 2018-05-19 DIAGNOSIS — S32.591A: Primary | ICD-10-CM

## 2018-05-19 DIAGNOSIS — F01.50: ICD-10-CM

## 2018-05-19 DIAGNOSIS — E83.42: ICD-10-CM

## 2018-05-19 DIAGNOSIS — E78.5: ICD-10-CM

## 2018-05-19 DIAGNOSIS — E03.9: ICD-10-CM

## 2018-05-19 DIAGNOSIS — G47.33: ICD-10-CM

## 2018-05-19 DIAGNOSIS — Z79.01: ICD-10-CM

## 2018-05-19 DIAGNOSIS — J44.9: ICD-10-CM

## 2018-05-19 DIAGNOSIS — S32.10XA: ICD-10-CM

## 2018-05-19 DIAGNOSIS — G89.4: ICD-10-CM

## 2018-05-19 DIAGNOSIS — E87.1: ICD-10-CM

## 2018-05-19 DIAGNOSIS — K21.9: ICD-10-CM

## 2018-05-19 DIAGNOSIS — I10: ICD-10-CM

## 2018-05-19 PROCEDURE — 99285 EMERGENCY DEPT VISIT HI MDM: CPT

## 2018-05-19 PROCEDURE — 94760 N-INVAS EAR/PLS OXIMETRY 1: CPT

## 2018-05-19 PROCEDURE — 80048 BASIC METABOLIC PNL TOTAL CA: CPT

## 2018-05-19 PROCEDURE — 36415 COLL VENOUS BLD VENIPUNCTURE: CPT

## 2018-05-19 PROCEDURE — 85025 COMPLETE CBC W/AUTO DIFF WBC: CPT

## 2018-05-19 PROCEDURE — 97116 GAIT TRAINING THERAPY: CPT

## 2018-05-19 PROCEDURE — 97112 NEUROMUSCULAR REEDUCATION: CPT

## 2018-05-19 PROCEDURE — 97163 PT EVAL HIGH COMPLEX 45 MIN: CPT

## 2018-05-19 PROCEDURE — 97110 THERAPEUTIC EXERCISES: CPT

## 2018-05-19 PROCEDURE — 83735 ASSAY OF MAGNESIUM: CPT

## 2018-05-19 PROCEDURE — 97530 THERAPEUTIC ACTIVITIES: CPT

## 2018-05-19 PROCEDURE — 76377 3D RENDER W/INTRP POSTPROCES: CPT

## 2018-05-19 PROCEDURE — 74176 CT ABD & PELVIS W/O CONTRAST: CPT

## 2018-05-19 PROCEDURE — 80053 COMPREHEN METABOLIC PANEL: CPT

## 2018-05-19 PROCEDURE — 84132 ASSAY OF SERUM POTASSIUM: CPT

## 2018-05-19 PROCEDURE — 96374 THER/PROPH/DIAG INJ IV PUSH: CPT

## 2018-05-19 RX ADMIN — Medication SCH: at 21:00

## 2018-05-19 RX ADMIN — TRAMADOL HYDROCHLORIDE PRN MG: 50 TABLET, COATED ORAL at 20:13

## 2018-05-19 RX ADMIN — ACETAMINOPHEN PRN MG: 500 TABLET, FILM COATED ORAL at 18:24

## 2018-05-19 RX ADMIN — SODIUM CHLORIDE SCH MLS: 0.9 INJECTION, SOLUTION INTRAVENOUS at 20:16

## 2018-05-19 NOTE — EDPHYS
Physician Documentation                                                                           

 [facility]                                                                

Name: Olga Martinez                                                                              

Age: 73 yrs                                                                                       

Sex: Female                                                                                       

: 1945                                                                                   

MRN: [id number]                                                                                   

Arrival Date: 2018                                                                          

Time: 11:38                                                                                       

Account#: [id number]                                                                            

Bed 14                                                                                            

Private MD:                                                                                       

ED Physician Norris Singh                                                                       

HPI:                                                                                              

                                                                                             

15:15 This 73 yrs old  Female presents to ER via Wheelchair with complaints of Back  gs  

      Pain.                                                                                       

15:15 The patient presents with pain that is acute. The symptoms are located in the lower     gs  

      back on right r pelvic area. Onset: The symptoms/episode began/occurred acutely, 1          

      week(s) ago. The pain radiates to the lateral aspect of right thigh. Associated signs       

      and symptoms: Pertinent negatives: incontinence, urinary retention. Modifying factors:      

      the patient symptoms are aggravated by movement. Severity of symptoms: At their worst       

      the symptoms were severe, in the emergency department the symptoms are unchanged. The       

      patient has experienced similar episodes in the past, a few times. The patient has been     

      recently seen by a physician: an orthopedic surgeon, The patient has been recently seen     

      at the [facility] Emergency Department, last week.                   

                                                                                                  

Historical:                                                                                       

- Allergies:                                                                                      

11:41 Sulfa (Sulfonamide Antibiotics);                                                        la1 

- Home Meds:                                                                                      

11:45 alprazolam 0.5 mg Oral Tb24 1 tab once daily [Active]; amiodarone 200 mg Oral tab once  rb1 

      daily [Active]; atorvastatin 40 mg Oral tab 1 tab once daily [Active]; baclofen 10 mg       

      Oral tab 1 tab twice daily [Active]; Bupivicaine pain pump [Active]; diclofenac sodium      

      75 mg Oral TbEC 1 tab 2 times per day [Active]; Dilaudid 4 mg Oral tab [Active];            

      Eliquis 5 mg Oral tab 2 times per day [Active]; Flonase 50 mcg/actuation Nasal spsn         

      [Active]; levothyroxine 25 mcg tab once daily [Active]; omeprazole 20 mg Oral TbEC 20       

      mg [Active]; Vitamin D Oral 2000 unit twice a day [Active];                                 

- PMHx:                                                                                           

11:41 High Cholesterol; Hypothyroidism; Dementia; Chronic pain; cardiiac arrythmia;           la1 

- PSHx:                                                                                           

11:45 pain pump;                                                                              rb1 

                                                                                                  

- Immunization history:: Adult Immunizations up to date.                                          

- Social history:: Smoking status: Patient uses tobacco products, smokes one-half pack            

  cigarettes per day.                                                                             

                                                                                                  

                                                                                                  

ROS:                                                                                              

15:15 All other systems are negative.                                                         gs  

                                                                                                  

Exam:                                                                                             

15:15 Eyes:  Pupils equal round and reactive to light, extra-ocular motions intact.  Lids and gs  

      lashes normal.  Conjunctiva and sclera are non-icteric and not injected.  Cornea within     

      normal limits.  Periorbital areas with no swelling, redness, or edema. ENT:  Nares          

      patent. No nasal discharge, no septal abnormalities noted.  Tympanic membranes are          

      normal and external auditory canals are clear.  Oropharynx with no redness, swelling,       

      or masses, exudates, or evidence of obstruction, uvula midline.  Mucous membranes           

      moist. Neck:  Trachea midline, no thyromegaly or masses palpated, and no cervical           

      lymphadenopathy.  Supple, full range of motion without nuchal rigidity, or vertebral        

      point tenderness.  No Meningismus. Chest/axilla:  Normal chest wall appearance and          

      motion.  Nontender with no deformity.  No lesions are appreciated. Cardiovascular:          

      Regular rate and rhythm with a normal S1 and S2.  No gallops, murmurs, or rubs.  Normal     

      PMI, no JVD.  No pulse deficits. Respiratory:  Lungs have equal breath sounds               

      bilaterally, clear to auscultation and percussion.  No rales, rhonchi or wheezes noted.     

       No increased work of breathing, no retractions or nasal flaring. Abdomen/GI:  Soft,        

      non-tender, with normal bowel sounds.  No distension or tympany.  No guarding or            

      rebound.  No evidence of tenderness throughout. Skin:  Warm, dry with normal turgor.        

      Normal color with no rashes, no lesions, and no evidence of cellulitis. Neuro:  Awake       

      and alert, GCS 15, oriented to person, place, time, and situation.  Cranial nerves          

      II-XII grossly intact.  Motor strength 5/5 in all extremities.  Sensory grossly intact.     

       Cerebellar exam normal.  Normal gait.                                                      

15:15 Constitutional: The patient appears alert, awake.                                           

15:15 Back: pain, that is moderate, of the  right low back.                                       

15:15 Musculoskeletal/extremity: Extremities: noted in the right hip: ROM: limited active         

      range of motion due to pain, limited passive range of motion due to pain, Circulation       

      is intact in all extremities.                                                               

                                                                                                  

Vital Signs:                                                                                      

11:41 Weight 83.01 kg; Height 5 ft. 8 in. (172.72 cm);                                        la1 

11:42  / 77; Pulse 93; Resp 20; Temp 97.9(TE); Pulse Ox 90% on R/A;                     la1 

12:40  / 74; Pulse 73; Resp 20; Pulse Ox 97% on R/A; Pain 10/10;                        rb1 

13:30  / 81; Pulse 74; Resp 19; Pulse Ox 98% on R/A;                                    rb1 

14:00  / 78; Pulse 72; Resp 19; Pulse Ox 94% on 2 lpm NC;                               rb1 

15:00  / 76; Pulse 77; Resp 19; Pulse Ox 97% on R/A;                                    rb1 

15:58  / 74; Pulse 88; Resp 19; Pulse Ox 98% on R/A;                                    rb1 

11:41 Body Mass Index 27.82 (83.01 kg, 172.72 cm)                                             la1 

                                                                                                  

MDM:                                                                                              

11:59 Patient medically screened.                                                             gs  

15:15 Differential diagnosis: Fracture Ligament Injury ruptured disc. Data reviewed: vital    gs  

      signs, nurses notes. Response to treatment: the patient's symptoms have mildly improved     

      after treatment, and as a result, I will admit patient. ED course: spoke to aktie alberto will admit pain control PT rehab.                                                   

                                                                                                  

                                                                                             

15:14 Order name: CBC with Automated Diff                                                     EDMS

                                                                                             

15:14 Order name: CBC with Automated Diff                                                     EDMS

                                                                                             

15:14 Order name: CBC with Automated Diff                                                     EDMS

                                                                                             

15:14 Order name: CBC with Automated Diff                                                     EDMS

                                                                                             

15:14 Order name: Comprehensive Metabolic Panel                                               EDMS

                                                                                             

15:14 Order name: Comprehensive Metabolic Panel                                               EDMS

                                                                                             

12:06 Order name: CT Stone Protocol; Complete Time: 13:30                                       

                                                                                             

15:14 Order name: CONS Physician Consult                                                      EDMS

                                                                                             

15:14 Order name: CONS Physician Consult                                                      EDMS

                                                                                             

15:14 Order name: NPO                                                                         EDMS

                                                                                             

15:14 Order name: Comprehensive Metabolic Panel                                               EDMS

                                                                                             

15:14 Order name: Comprehensive Metabolic Panel                                               EDMS

                                                                                                  

Administered Medications:                                                                         

14:45 Drug: fentaNYL (PF) 50 mcg Route: IVP; Site: right antecubital;                         ch  

15:05 Follow up: Response: No adverse reaction; Pain is unchanged, physician notified         rb1 

                                                                                                  

                                                                                                  

Disposition:                                                                                      

18 15:20 Hospitalization ordered by Jack Boyle for Inpatient Admission. Preliminary      

  diagnosis is Multiple fractures of pelvis without disruption of pelvic ring.                    

- Bed requested for Telemetry/MedSurg (Inpatient).                                                

- Status is Inpatient Admission.                                                              rb1 

- Condition is Stable.                                                                            

- Problem is new.                                                                                 

- Symptoms have improved.                                                                         

UTI on Admission? No                                                                              

                                                                                                  

                                                                                                  

                                                                                                  

Signatures:                                                                                       

Dispatcher [employer]                           EDMS                                                 

Mercy Nuñez, RN                  RN                                                      

Matthias Mora RN                         RN   la1                                                  

Priti Matthew Rebecca, RN                     RN   rb1                                                  

Norris Singh MD MD                                                      

                                                                                                  

Corrections: (The following items were deleted from the chart)                                    

15:57 15:20 Hospitalization Ordered by Jack Boyle MD for Inpatient Admission. Preliminary   ag  

      diagnosis is Multiple fractures of pelvis without disruption of pelvic ring. Bed            

      requested for Telemetry/MedSurg (Inpatient). Status is Inpatient Admission. Condition       

      is Stable. Problem is new. Symptoms have improved. UTI on Admission? No. gs                 

16:43 15:57 2018 15:20 Hospitalization Ordered by Jack Boyle MD for Inpatient         rb1 

      Admission. Preliminary diagnosis is Multiple fractures of pelvis without disruption of      

      pelvic ring. Bed requested for Telemetry/MedSurg (Inpatient). Status is Inpatient           

      Admission. Condition is Stable. Problem is new. Symptoms have improved. UTI on              

      Admission? No. ag                                                                           

                                                                                                  

**************************************************************************************************

## 2018-05-19 NOTE — RAD REPORT
EXAM DESCRIPTION:  CT - Stone Protocol - 5/19/2018 12:41 pm

 

CLINICAL HISTORY:  Back pain, abdominal pain, dementia

 

COMPARISON:  CT trauma study 2017

 

TECHNIQUE:  Axial 5 mm thick images were obtained without oral or IV contrast. The field-of-view span
s the entirety of the  system partially obscuring uppermost abdomen and lung bases.

 

All CT scans are performed using dose optimization technique as appropriate and may include automated
 exposure control or mA/KV adjustment according to patient size.

 

FINDINGS:  No hydronephrosis is present and no obstructing ureteral calculi. No suspicious renal mass
es. Isodense masses and pyelonephritis are not excluded on a stone protocol CT scan. The small 2 cent
imeter left adrenal adenoma present of no significance.

 

Imaged portions of the liver, spleen and pancreas show no suspicious findings on non-contrast imaging
. Cholecystectomy clips are present. No biliary tree dilatation. No significant adrenal finding.

 

Stomach is decompressed limiting assessment. No gross gastric abnormality. Small bowel loops are norm
al in diameter. Large amount of stool is present filling but not dilating the colon. Diverticulosis i
s minimal. No active GI process seen. No omental thickening or bulky lymphadenopathy.

 

There is prominent fatty tissue in the presacral region. This displaces anteriorly the rectum. There 
is some stranding in this fatty tissue. No progression from 2017. Stability over greater than 1 year 
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 No free ai
r, free fluid or inflammatory stranding.

 

Dense vascular calcifications are present. Lumbar spine disc and bone degenerative changes are presen
t. No acute lumbar spine finding. No fracture of either proximal femur. Right sacral ala fracture is 
present and there is fracture of the superior and inferior pubic rami at the pubic symphysis on the r
ight. No left hemipelvis fracture.

 

IMPRESSION:  Right-sided pelvic fracture. Fractures are present at the superior and inferior pubic ra
mi junction with the pubic symphysis. Right sacral ala fracture is also present.

 

Large amount of stool filling but not dilating the entirety of the colon. Active colon process is not
 seen.

 

No hydronephrosis or acute  finding.

 

Isodense masses and pyelonephritis are not excluded on stone protocol technique.

## 2018-05-19 NOTE — ER
Nurse's Notes                                                                                     

 [facility]                                                                

Name: Olga Martinez                                                                              

Age: 73 yrs                                                                                       

Sex: Female                                                                                       

: 1945                                                                                   

MRN: [id number]                                                                                   

Arrival Date: 2018                                                                          

Time: 11:38                                                                                       

Account#: [id number]                                                                            

Bed 14                                                                                            

Private MD:                                                                                       

Diagnosis: Multiple fractures of pelvis without disruption of pelvic ring                         

                                                                                                  

Presentation:                                                                                     

                                                                                             

11:41 Presenting complaint: Patient states: I have severe pain in my lower back and its       la1 

      gotten worse since I was here on thursday. Transition of care: patient was not received     

      from another setting of care. Onset of symptoms was May 19, 2018. Initial Sepsis            

      Screen: Does the patient meet any 2 criteria? No. Patient's initial sepsis screen is        

      negative. Does the patient have a suspected source of infection? No. Patient's initial      

      sepsis screen is negative. Care prior to arrival: None.                                     

11:41 Method Of Arrival: Wheelchair                                                           la1 

11:41 Acuity: DIMA 3                                                                           la1 

                                                                                                  

Historical:                                                                                       

- Allergies:                                                                                      

11:41 Sulfa (Sulfonamide Antibiotics);                                                        la1 

- Home Meds:                                                                                      

11:45 alprazolam 0.5 mg Oral Tb24 1 tab once daily [Active]; amiodarone 200 mg Oral tab once  rb1 

      daily [Active]; atorvastatin 40 mg Oral tab 1 tab once daily [Active]; baclofen 10 mg       

      Oral tab 1 tab twice daily [Active]; Bupivicaine pain pump [Active]; diclofenac sodium      

      75 mg Oral TbEC 1 tab 2 times per day [Active]; Dilaudid 4 mg Oral tab [Active];            

      Eliquis 5 mg Oral tab 2 times per day [Active]; Flonase 50 mcg/actuation Nasal spsn         

      [Active]; levothyroxine 25 mcg tab once daily [Active]; omeprazole 20 mg Oral TbEC 20       

      mg [Active]; Vitamin D Oral 2000 unit twice a day [Active];                                 

- PMHx:                                                                                           

11:41 High Cholesterol; Hypothyroidism; Dementia; Chronic pain; cardiiac arrythmia;           la1 

- PSHx:                                                                                           

11:45 pain pump;                                                                              rb1 

                                                                                                  

- Immunization history:: Adult Immunizations up to date.                                          

- Social history:: Smoking status: Patient uses tobacco products, smokes one-half pack            

  cigarettes per day.                                                                             

                                                                                                  

                                                                                                  

Screenin:45 Abuse screen: Denies threats or abuse. Nutritional screening: No deficits noted.        rb1 

      Tuberculosis screening: No symptoms or risk factors identified. Fall Risk No fall in        

      past 12 months (0 pts). Secondary diagnosis (15 points) dementia, No IV (0 pts).            

      Ambulatory Aid- None/Bed Rest/Nurse Assist (0 pts). Gait- Impaired (20 pts.). Mental        

      Status- Overestimates/Forgets Limitations (15 pts.). Total Murillo Fall Scale indicates       

      High Risk Score (45 or more points). Fall prevention measures have been instituted.         

      Side Rails Up X 2 Placed Close to Nursing Station 1:1 Attendant Assigned Frequent           

      Obs/Assessments Occuring Family Present and informed to notify staff if the need to         

      leave the bedside As available patient and family educated on Fall Prevention Program       

      and Strategies.                                                                             

                                                                                                  

Assessment:                                                                                       

11:45 General: Appears uncomfortable, Behavior is calm, cooperative. Pain: Complains of pain  rb1 

      in right low back Pain radiates to right leg Pain currently is 10 out of 10 on a pain       

      scale. Neuro: Level of Consciousness is awake, alert, obeys commands, Oriented to           

      person, place, time, situation. Cardiovascular: Capillary refill < 3 seconds is brisk       

      in bilateral fingers. Respiratory: Airway is patent Respiratory effort is even,             

      unlabored, Respiratory pattern is regular, symmetrical. GI: No signs and/or symptoms        

      were reported involving the gastrointestinal system. : Reports incontinence. Derm:        

      Skin is pink, warm \T\ dry. Musculoskeletal: Range of motion: intact in all extremities.    

11:45 Derm: Pain pump implanted on the right lower abdomen.                                   [facility] 

12:42 Reassessment: Patient appears in no apparent distress at this time. pt returned from ct.  

13:00 Reassessment: pt. requested pain medication, Dr. Singh notified. No order received at   [facility] 

      this time.                                                                                  

13:15 Reassessment: Pt. is getting agitated because she wants something for pain. We are      [facility] 

      currently waiting on CT results. Pt. has a pain pump implanted.                             

14:00 Reassessment: Patient appears in no apparent distress at this time. No changes from     rb1 

      previously documented assessment.                                                           

15:00 Reassessment: Patient appears in no apparent distress at this time. Patient and/or      rb1 

      family updated on plan of care and expected duration. Pain level reassessed. Patient is     

      alert, oriented x 3, equal unlabored respirations, skin warm/dry/pink.                      

16:09 Reassessment: Patient appears in no apparent distress at this time. No changes from     rb1 

      previously documented assessment. Pt. is requesting pain medication, Dr. Boyle was          

      notified. No orders received at this time.                                                  

16:25 Reassessment: Called report to Cha Hickman RN. Information from the SBAR was given.  rb1 

      All questions asked and answered.                                                           

                                                                                                  

Vital Signs:                                                                                      

11:41 Weight 83.01 kg; Height 5 ft. 8 in. (172.72 cm);                                        la1 

11:42  / 77; Pulse 93; Resp 20; Temp 97.9(TE); Pulse Ox 90% on R/A;                     la1 

12:40  / 74; Pulse 73; Resp 20; Pulse Ox 97% on R/A; Pain 10/10;                        rb1 

13:30  / 81; Pulse 74; Resp 19; Pulse Ox 98% on R/A;                                    rb1 

14:00  / 78; Pulse 72; Resp 19; Pulse Ox 94% on 2 lpm NC;                               rb1 

15:00  / 76; Pulse 77; Resp 19; Pulse Ox 97% on R/A;                                    rb1 

15:58  / 74; Pulse 88; Resp 19; Pulse Ox 98% on R/A;                                    rb1 

11:41 Body Mass Index 27.82 (83.01 kg, 172.72 cm)                                             la1 

                                                                                                  

ED Course:                                                                                        

11:38 Patient arrived in ED.                                                                  sb2 

11:40 Arm band placed on left wrist.                                                          la1 

11:42 Triage completed.                                                                       la1 

11:45 Norris Singh MD is Attending Physician.                                              gs  

11:45 Patient has correct armband on for positive identification. Bed in low position. Call   rb1 

      light in reach. Side rails up X2. Pulse ox on. NIBP on.                                     

11:51 Mary Carmen Jimenez, RN is Primary Nurse.                                                   rb1 

12:41 CT Stone Protocol In Process Unspecified.                                               EDMS

12:42 CT completed. Patient tolerated procedure well. Patient moved back from CT.             bq  

14:45 Inserted saline lock: 20 gauge in right antecubital area, using aseptic technique.      ch  

15:18 Jack Boyle MD is Hospitalizing Provider.                                            gs  

16:39 No provider procedures requiring assistance completed. Patient admitted, IV remains in  rb1 

      place.                                                                                      

                                                                                                  

Administered Medications:                                                                         

14:45 Drug: fentaNYL (PF) 50 mcg Route: IVP; Site: right antecubital;                         ch  

15:05 Follow up: Response: No adverse reaction; Pain is unchanged, physician notified         rb1 

                                                                                                  

                                                                                                  

Output:                                                                                           

15:13 Urine: 1ml (Voided); Total: 1ml.                                                        rb1 

                                                                                                  

Outcome:                                                                                          

15:20 Decision to Hospitalize by Provider.                                                      

16:39 Admitted to Med/surg accompanied by tech, via stretcher, room 218, with oxygen, with    rb1 

      chart, Other O2  2 L NC Report called to  Cha Hickman RN                                 

16:39 Condition: stable                                                                           

16:39 Instructed on the need for admit.                                                           

16:39 Patient left the ED.                                                                    rb1 

                                                                                                  

Signatures:                                                                                       

Dispatcher [employer]                           EDMS                                                 

Mercy Nuñez, RN                  RN   Olga Vides Lee RN                         RN   laMary Carmen Morales RN                     RN   rb1                                                  

Norris Singh MD MD gs Billeau, Sheri sb2                                                  

                                                                                                  

Corrections: (The following items were deleted from the chart)                                    

16:43 16:43 Patient left the ED. rb1                                                          rb1 

                                                                                                  

**************************************************************************************************

## 2018-05-19 NOTE — HP
Date of Admission:  05/19/2018



Consultants:  Dr. Day with Orthopedic Surgery.



Chief Complaint:  Right-sided flank pain, hip pain.



Code Status:  Full.



History Of Present Illness:  The patient is a 73-year-old female with past 
medical history of chronic back pain on fentanyl pump, dyslipidemia, anxiety, 
GERD, hypothyroidism, atrial fibrillation on Eliquis, history of SVT, and 
likely COPD, who was in her usual state of health until 1 week prior to 
admission when the patient has been having right-sided flank pain and hip pain.
  The patient denies any fall, trauma or history of osteoporosis.  The patient 
had seen her Orthopedic surgeon, Dr. Day recently, and was diagnosed with 
sciatica, was given a Medrol Dosepak.  The patient also sees Dr. Ortiz for 
her chronic pain and her fentanyl pain pump was recently re-loaded.  The 
patient came into the ER for worsening symptoms.  She denies any fevers, 
chills.  Does report some nausea, but no vomiting.  Her symptoms are constant, 
moderate, progressively worsening.  Aggravating factors include walking.  When 
the patient came into the ER, her vital signs were stable, she was afebrile.  
The patient did receive fentanyl for pain and her O2 saturations did decrease 
to 84%, however, she recovered.  Of note, the patient has had difficulty with 
opioid misuse and overdose and has been intubated in the past.  When seen in 
the ER, the patient was awake, alert, oriented x3.  Asking for something for 
pain.  She stated she had not received anything even though nursing staff 
stated that she has.



Past Medical History:  Chronic back pain, anxiety disorder, hyperlipidemia, GERD
, hypothyroidism, atrial fibrillation on Eliquis, history of SVT.



Past Surgical History:  Right knee replacement.  Pain-pump placement.



Allergies:  TO SULFA DRUGS CAUSES RASH.



Medications:  As per medication reconciliation list, personally reviewed.



Family History:  Father had coronary artery disease.



Social History:  The patient is , lives at home.  Uses a walker.  She is 
an every day smoker, smokes about half a pack per day.  Denies any alcohol use 
or illicit drug use.



Review of Systems:

An 11-point system reviewed, negative except as per HPI.



Physical Examination:

Vital Signs:  Blood pressure 137/77, pulse 93, respirations 20, temperature 97.9
, O2 90% on room air. 

General:  Awake, alert, oriented x3.  Elderly female, slightly ill appearing. 

HEENT:  Normocephalic, atraumatic.  PERRLA.  EOMI.  Moist mucous membranes.  
Oropharynx is clear.  Poor dentition.  Conjunctiva is anicteric. 

Neck:  Supple.  No JVD.  Trachea midline. 

CV:  S1, S2.  No murmurs.  Peripheral pulses are present bilaterally. 

Respiratory:  Moving air well bilaterally.  No wheezing.  No stridor.  No use 
of accessory muscles. 

Gastrointestinal:  Abdomen is soft, nontender, nondistended.  Positive bowel 
sounds.  No guarding or rigidity. 

Musculoskeletal:  Tenderness mid lumbar spine. 

Skin:  No rashes.  Normal skin turgor. 

Neuro:  Cranial nerves 2 through 12 intact grossly.  No focal neurological 
deficit.  Speech is normal.



Laboratory Data:  Labs pending.



Imaging Studies:  CT scan of the abdomen and pelvis shows right-sided pelvic 
fracture.  Fractures are present at the superoinferior pubic rami junction with 
the pubic symphysis.  Right sacral ala fracture is also present.  Large amount 
of stool filling, but not dilating in the entirety of the colon.  Active colon 
process is not seen.  No hydronephrosis or acute  finding.



Assessment:  A 73-year-old female with:

1.   Acute pelvic fracture, superoinferior pubic rami along with right sacral 
ala, are fractured.  I spoke with Dr. Day.  The patient is nonsurgical, 
weightbearing as tolerated.  We will obtain Physical Therapy consultation and 
consider rehab placement.  We will be cautious with pain control.  The patient 
is on fentanyl pain pump, became hypoxic with fentanyl IV, and has history of 
opioid abuse, and has been intubated previously for opioid overdose.  The 
patient will start off with p.o. tramadol.  Place on continuous pulse ox and 
monitor closely.  IV Narcan p.r.n.

2.   Atrial fibrillation, paroxysmal, on Eliquis.  The patient is nonsurgical.  
We will resume her anticoagulant.

3.   Chronic back pain.  We will continue fentanyl pain pump.  The patient 
receiving less than approximately 20 mcg in 24 hours.  Spoke with Dr. Ortiz, 
who is out of town at this time.

4.   Generalized anxiety disorder.

5.   Hyperlipidemia.

6.   We will continue statin.

7.   Gastroesophageal reflux disease without esophagitis.  Continue proton-pump 
inhibitor.

8.   Hypothyroidism.  We will continue home medications.

9.   Obesity.

10.   Gastrointestinal.  Deep vein thrombosis prophylaxis addressed.



Plan:  Admit the patient to Med-Surg, place as inpatient.

No medical power of  or living will



/LISA

DD:  05/19/2018 16:49:55   Voice ID:  [id number]

MTDD

## 2018-05-20 LAB
ALBUMIN SERPL BCP-MCNC: 3.4 G/DL (ref 3.2–5.5)
ALP SERPL-CCNC: 99 IU/L (ref 42–121)
ALT SERPL W P-5'-P-CCNC: 18 IU/L (ref 10–60)
AST SERPL W P-5'-P-CCNC: 19 IU/L (ref 10–42)
BUN BLD-MCNC: 10 MG/DL (ref 6–20)
GLUCOSE SERPLBLD-MCNC: 99 MG/DL (ref 65–120)
HCT VFR BLD CALC: 39.9 % (ref 36–45)
LYMPHOCYTES # SPEC AUTO: 2.9 K/UL (ref 0.7–4.9)
MCH RBC QN AUTO: 28.6 PG (ref 27–35)
MCV RBC: 87.6 FL (ref 80–100)
PMV BLD: 7.8 FL (ref 7.6–11.3)
POTASSIUM SERPL-SCNC: 3.5 MEQ/L (ref 3.6–5)
RBC # BLD: 4.55 M/UL (ref 3.86–4.86)

## 2018-05-20 RX ADMIN — APIXABAN SCH MG: 5 TABLET, FILM COATED ORAL at 20:46

## 2018-05-20 RX ADMIN — Medication SCH ML: at 20:46

## 2018-05-20 RX ADMIN — TRAMADOL HYDROCHLORIDE PRN MG: 50 TABLET, COATED ORAL at 16:20

## 2018-05-20 RX ADMIN — SODIUM CHLORIDE SCH MLS: 0.9 INJECTION, SOLUTION INTRAVENOUS at 03:50

## 2018-05-20 RX ADMIN — ALPRAZOLAM SCH MG: 0.5 TABLET ORAL at 09:28

## 2018-05-20 RX ADMIN — APIXABAN SCH MG: 5 TABLET, FILM COATED ORAL at 09:28

## 2018-05-20 RX ADMIN — TRAMADOL HYDROCHLORIDE PRN MG: 50 TABLET, COATED ORAL at 03:50

## 2018-05-20 RX ADMIN — Medication SCH ML: at 09:29

## 2018-05-20 RX ADMIN — PANTOPRAZOLE SODIUM SCH MG: 40 TABLET, DELAYED RELEASE ORAL at 09:29

## 2018-05-20 RX ADMIN — AMIODARONE HYDROCHLORIDE SCH MG: 200 TABLET ORAL at 09:28

## 2018-05-20 RX ADMIN — ATORVASTATIN CALCIUM SCH MG: 40 TABLET, FILM COATED ORAL at 20:46

## 2018-05-20 NOTE — PN
Date of Progress Note:  05/20/2018



Subjective:  The patient seen and examined.  Chart reviewed and case discussed with RN.  The patient 
has been trying to take her pain medications from home by herself without informing staff.  She was c
ounseled against this practice.  She has had previous history of drug overdose and has required intub
ation.  The pain is improved.



Review of Systems:

Negative except as above.



Medications:  Reviewed.



Physical Examination:

Vital Signs:  Temperature 97.4, heart rate 80, blood pressure 151/67, respirations 20, O2 98% on 1 L 
via nasal cannula. 

General:  Awake, alert, oriented x3, and not in any acute distress.  Elderly female. 

CV:  S1, S2.  Regular rate and rhythm.  Peripheral pulses present. 

Respiratory:  Clear to auscultation bilaterally.  No wheezing.  No stridor.  No use of accessory musc
les. 

Gastrointestinal:  Abdomen is soft, nontender, nondistended.  Positive bowel sounds. 

Extremities:  No clubbing, cyanosis, or edema. 

Neurologic:  Nonfocal. 

Musculoskeletal:  Some tenderness and the lower back pain in lower back midline.



Laboratory Data:  Sodium 136, potassium 3.5, chloride 99, CO2 34, BUN 10, creatinine 0.34, glucose 99
, calcium 8.8.  WBC 10.8, H and H 13, 39.3, platelets 322.



Assessment:  A 73-year-old female with;

1.Acute pelvic fracture, superior inferior pubic rami along with right sacral ala.  This is nonsurgi
jose.  Weightbearing as tolerated.  Appreciate Dr. Day's input.  We will continue with pain medic
ations.  The patient is already on fentanyl pain pump.  Dr. Ortiz.

2.recommends cautious p.o. pain medications.  We will continue with continuous pulse ox monitoring a
nd Narcan p.r.n.  Continue with tramadol p.o. t.i.d. 50 mg.  Continue with physical therapy evaluatio
n inpatient referral for rehab.

3.Atrial fibrillation, paroxysmal, on Eliquis.  Continue anticoagulant.

4.Chronic back pain.  The patient has pain pump recently adjusted by Dr. Ortiz.

5.Generalized anxiety disorder.  Continue benzodiazepines.

6.Hyperlipidemia.  Continue statin.

7.Gastroesophageal reflux disease without esophagitis.  Continue PPI.

8.Hypothyroidism.  Continue home medication.

9.Overweight, BMI 27.

10.Gastrointestinal and deep venous thrombosis prophylaxis addressed.



Plan:  PT evaluation.  Rehab referral.





/LISA

DD:  05/20/2018 10:37:19Voice ID:  [id number]

DT:  05/20/2018 13:35:34Report ID:  [id number]

## 2018-05-20 NOTE — P.PN
Subjective


Date of Service: 05/20/18





Physical Examination





- Vital Signs


Temperature: 97.4 F


Blood Pressure: 135/73


Pulse: 88


Respirations: 18


Pulse Ox (%): 95





Assessment & Plan





- Problems (Diagnosis)


(1) Pelvic fracture


Current Visit: Yes   Status: Acute   


Qualifiers: 


   Encounter type: initial encounter   Pelvic bone location: multiple parts   

Fracture type: closed   Fracture alignment: without disruption of pelvic ring   

Qualified Code(s): S32.82XA - Multiple fractures of pelvis without disruption 

of pelvic ring, initial encounter for closed fracture   





(2) Risk for falls


Current Visit: Yes   Status: Acute   





(3) Anxiety


Current Visit: Yes   Status: Chronic   





(4) Atrial fibrillation


Current Visit: No   Status: Chronic   


Qualifiers: 


 





(5) Chronic anticoagulation


Current Visit: No   Status: Chronic   





(6) Chronic pain syndrome


Onset Date: 04/09/18   Current Visit: No   Status: Chronic   





(7) Hyperlipidemia


Current Visit: No   Status: Chronic   


Qualifiers: 


 





(8) Hypertension


Current Visit: No   Status: Chronic   


Qualifiers: 


   Hypertension type: essential hypertension 





(9) Hypothyroidism


Current Visit: No   Status: Chronic   


Qualifiers: 


   Hypothyroidism type: unspecified 





(10) Vascular dementia


Current Visit: No   Status: Chronic   


Qualifiers: 


   Dementia behavioral disturbance: without behavioral disturbance 





(11) Obstructive sleep apnea


Current Visit: No   Status: Suspected   


Discharge Plan: Other (Inpatient rehab v SNF)


Plan to discharge in: 24 Hours

## 2018-05-21 LAB
ALBUMIN SERPL BCP-MCNC: 3.1 G/DL (ref 3.2–5.5)
ALP SERPL-CCNC: 99 IU/L (ref 42–121)
ALT SERPL W P-5'-P-CCNC: 16 IU/L (ref 10–60)
AST SERPL W P-5'-P-CCNC: 18 IU/L (ref 10–42)
BUN BLD-MCNC: 12 MG/DL (ref 6–20)
GLUCOSE SERPLBLD-MCNC: 114 MG/DL (ref 65–120)
HCT VFR BLD CALC: 39.8 % (ref 36–45)
LYMPHOCYTES # SPEC AUTO: 2.8 K/UL (ref 0.7–4.9)
MAGNESIUM SERPL-MCNC: 1.7 MG/DL (ref 1.8–2.5)
MCH RBC QN AUTO: 28.2 PG (ref 27–35)
MCV RBC: 87.1 FL (ref 80–100)
PMV BLD: 7.7 FL (ref 7.6–11.3)
POTASSIUM SERPL-SCNC: 3.5 MEQ/L (ref 3.6–5)
RBC # BLD: 4.57 M/UL (ref 3.86–4.86)

## 2018-05-21 RX ADMIN — AMIODARONE HYDROCHLORIDE SCH MG: 200 TABLET ORAL at 08:26

## 2018-05-21 RX ADMIN — ACETAMINOPHEN PRN MG: 500 TABLET, FILM COATED ORAL at 13:15

## 2018-05-21 RX ADMIN — PANTOPRAZOLE SODIUM SCH MG: 40 TABLET, DELAYED RELEASE ORAL at 06:35

## 2018-05-21 RX ADMIN — TRAMADOL HYDROCHLORIDE PRN MG: 50 TABLET, COATED ORAL at 08:25

## 2018-05-21 RX ADMIN — TRAMADOL HYDROCHLORIDE PRN MG: 50 TABLET, COATED ORAL at 00:05

## 2018-05-21 RX ADMIN — Medication SCH ML: at 08:27

## 2018-05-21 RX ADMIN — MORPHINE SULFATE PRN MG: 2 INJECTION, SOLUTION INTRAMUSCULAR; INTRAVENOUS at 22:28

## 2018-05-21 RX ADMIN — LEVOTHYROXINE SODIUM SCH MG: 25 TABLET ORAL at 06:35

## 2018-05-21 RX ADMIN — ALPRAZOLAM SCH MG: 0.5 TABLET ORAL at 08:26

## 2018-05-21 RX ADMIN — APIXABAN SCH MG: 5 TABLET, FILM COATED ORAL at 20:39

## 2018-05-21 RX ADMIN — APIXABAN SCH MG: 5 TABLET, FILM COATED ORAL at 08:25

## 2018-05-21 RX ADMIN — TRAMADOL HYDROCHLORIDE PRN MG: 50 TABLET, COATED ORAL at 19:13

## 2018-05-21 RX ADMIN — ATORVASTATIN CALCIUM SCH MG: 40 TABLET, FILM COATED ORAL at 20:39

## 2018-05-21 RX ADMIN — Medication SCH ML: at 20:39

## 2018-05-21 NOTE — P.PN
Subjective


Date of Service: 05/21/18


Subjective: Improving





Physical Examination





- Vital Signs


Temperature: 97.0 F


Blood Pressure: 141/71


Pulse: 90


Respirations: 18


Pulse Ox (%): 95





- Physical Exam


General: Alert, In no apparent distress, Cooperative


HEENT: Atraumatic


Neck: Supple


Respiratory: Clear to auscultation bilaterally, Normal air movement


Cardiovascular: Normal pulses, Regular rate/rhythm


Gastrointestinal: Normal bowel sounds, Soft and benign, Non-distended, No masses

, No rebound, No guarding


Musculoskeletal: Other (Mild pain to the pelvic region with deep palpation.)


Neurological: Normal speech, Normal strength at 5/5 x4 extr, Normal tone





- Studies


Medications List Reviewed: Yes





Assessment & Plan





- Problems (Diagnosis)


(1) Pelvic fracture


Onset Date: 05/21/18   Current Visit: Yes   Status: Acute   


Plan: 


Patient with pelvic fracture.  Nonsurgical noted. Patient to be evaluated by 

physical therapy for inpatient rehab.  Await orthopedic recommendation.  

Patient with chronic pain. Will continue with pain control medication.


Qualifiers: 


   Encounter type: initial encounter   Pelvic bone location: multiple parts   

Fracture type: closed   Fracture alignment: without disruption of pelvic ring   

Qualified Code(s): S32.82XA - Multiple fractures of pelvis without disruption 

of pelvic ring, initial encounter for closed fracture   





(2) Risk for falls


Current Visit: Yes   Status: Acute   


Plan: 


Patient will be a good candidate for inpatient rehab versus skilled placement.








(3) Anxiety


Current Visit: Yes   Status: Chronic   


Plan: 


Continue with medication as needed.








(4) Atrial fibrillation


Current Visit: No   Status: Chronic   


Plan: 


Continue with her medication


Qualifiers: 


 





(5) Chronic anticoagulation


Current Visit: No   Status: Chronic   


Plan: 


Continue with medication








(6) Chronic pain syndrome


Onset Date: 04/09/18   Current Visit: No   Status: Chronic   


Plan: 


Patient is seen by pain management as an outpatient.  Pain management consulted 

to evaluate her pain pump.








(7) Hyperlipidemia


Onset Date: 05/21/18   Current Visit: Yes   Status: Chronic   


Plan: 


Continue with medication


Qualifiers: 


 





(8) Hypertension


Current Visit: No   Status: Chronic   


Plan: 


Continue with medication


Qualifiers: 


   Hypertension type: essential hypertension 





(9) Hypothyroidism


Onset Date: 05/21/18   Current Visit: Yes   Status: Chronic   


Plan: 


Continue medication


Qualifiers: 


   Hypothyroidism type: unspecified 





(10) Vascular dementia


Current Visit: No   Status: Chronic   


Plan: 


Overall stable.  Will monitor closely.  Patient appropriate.


Qualifiers: 


   Dementia behavioral disturbance: without behavioral disturbance 





(11) Obstructive sleep apnea


Current Visit: No   Status: Suspected   


Plan: 


This can be further evaluated as an outpatient.








(12) Hypokalemia


Current Visit: Yes   Status: Acute   


Plan: 


Will monitor and replace appropriately.  Replacement protocol in place








(13) Hypomagnesemia


Current Visit: Yes   Status: Acute   


Plan: 


Continue with replacement protocol








(14) Hyponatremia


Current Visit: No   Status: Acute   


Plan: 


Encourage oral intake.





Discharge Plan: Other (Inpatient rehab)


Plan to discharge in: 24 Hours


Time Spent Managing Pts Care (In Minutes): 55

## 2018-05-22 LAB
ALBUMIN SERPL BCP-MCNC: 3.4 G/DL (ref 3.2–5.5)
ALP SERPL-CCNC: 100 IU/L (ref 42–121)
ALT SERPL W P-5'-P-CCNC: 16 IU/L (ref 10–60)
AST SERPL W P-5'-P-CCNC: 22 IU/L (ref 10–42)
BUN BLD-MCNC: 10 MG/DL (ref 6–20)
GLUCOSE SERPLBLD-MCNC: 111 MG/DL (ref 65–120)
HCT VFR BLD CALC: 41.8 % (ref 36–45)
LYMPHOCYTES # SPEC AUTO: 2.9 K/UL (ref 0.7–4.9)
MAGNESIUM SERPL-MCNC: 1.7 MG/DL (ref 1.8–2.5)
MCH RBC QN AUTO: 28.6 PG (ref 27–35)
MCV RBC: 87.3 FL (ref 80–100)
PMV BLD: 8 FL (ref 7.6–11.3)
POTASSIUM SERPL-SCNC: 4 MEQ/L (ref 3.6–5)
RBC # BLD: 4.79 M/UL (ref 3.86–4.86)

## 2018-05-22 RX ADMIN — ATORVASTATIN CALCIUM SCH MG: 40 TABLET, FILM COATED ORAL at 20:41

## 2018-05-22 RX ADMIN — APIXABAN SCH MG: 5 TABLET, FILM COATED ORAL at 08:42

## 2018-05-22 RX ADMIN — Medication SCH ML: at 08:47

## 2018-05-22 RX ADMIN — MORPHINE SULFATE PRN MG: 2 INJECTION, SOLUTION INTRAMUSCULAR; INTRAVENOUS at 08:42

## 2018-05-22 RX ADMIN — Medication SCH ML: at 20:42

## 2018-05-22 RX ADMIN — MORPHINE SULFATE PRN MG: 2 INJECTION, SOLUTION INTRAMUSCULAR; INTRAVENOUS at 02:55

## 2018-05-22 RX ADMIN — ALPRAZOLAM SCH MG: 0.5 TABLET ORAL at 08:42

## 2018-05-22 RX ADMIN — LEVOTHYROXINE SODIUM SCH MG: 25 TABLET ORAL at 06:13

## 2018-05-22 RX ADMIN — TRAMADOL HYDROCHLORIDE PRN MG: 50 TABLET, COATED ORAL at 14:37

## 2018-05-22 RX ADMIN — SODIUM CHLORIDE SCH MLS: 0.9 INJECTION, SOLUTION INTRAVENOUS at 20:46

## 2018-05-22 RX ADMIN — ACETAMINOPHEN PRN MG: 500 TABLET, FILM COATED ORAL at 06:12

## 2018-05-22 RX ADMIN — AMIODARONE HYDROCHLORIDE SCH MG: 200 TABLET ORAL at 08:42

## 2018-05-22 RX ADMIN — SODIUM CHLORIDE SCH MLS: 0.9 INJECTION, SOLUTION INTRAVENOUS at 08:47

## 2018-05-22 RX ADMIN — ACETAMINOPHEN PRN MG: 500 TABLET, FILM COATED ORAL at 00:17

## 2018-05-22 RX ADMIN — APIXABAN SCH MG: 5 TABLET, FILM COATED ORAL at 20:41

## 2018-05-22 RX ADMIN — TRAMADOL HYDROCHLORIDE PRN MG: 50 TABLET, COATED ORAL at 20:41

## 2018-05-22 RX ADMIN — PANTOPRAZOLE SODIUM SCH MG: 40 TABLET, DELAYED RELEASE ORAL at 08:42

## 2018-05-22 NOTE — HP
Date of Admission:  05/19/2018



History:  Mrs. Martinez is a 73-year-old female who has history of chronic low back pain.  Her pain h
as been increased over the last several weeks.  She recently has had an x-ray which showed that she h
ad a pelvic fracture.  She was admitted for the pain control.  On arrival, Mrs. Martinez was awake an
d alert.  She did not appear to be in any acute distress.  Her pain seemed to be well under control a
t this time.  She is going to be scheduled to get some rehab.  For that reason, the intrathecal pump 
was evaluated.  Her current rate was 27 mcg of sufentanil per day.  We increased it to 31 mcg sufenta
nil per day, and we will follow along with her for her pain management.





KURTIS

DD:  05/21/2018 16:34:13Voice ID:  [id number]

## 2018-05-22 NOTE — P.PN
Subjective


Date of Service: 05/22/18


Subjective: Improving





Physical Examination





- Vital Signs


Temperature: 97.2 F


Blood Pressure: 150/69


Pulse: 88


Respirations: 20


Pulse Ox (%): 92





- Physical Exam


General: Alert, In no apparent distress, Cooperative


HEENT: Atraumatic


Neck: Supple


Respiratory: Clear to auscultation bilaterally, Normal air movement


Cardiovascular: Normal pulses, Regular rate/rhythm


Gastrointestinal: Normal bowel sounds, Soft and benign, Non-distended, No 

tenderness, No masses, No rebound, No guarding


Musculoskeletal: No erythema, No tenderness, No warmth


Integumentary: No tenderness/swelling, No erythema, No warmth, No cyanosis


Neurological: Normal speech, Normal strength at 5/5 x4 extr, Normal tone





- Studies


Medications List Reviewed: Yes





Assessment & Plan





- Problems (Diagnosis)


(1) Pelvic fracture


Onset Date: 05/21/18   Current Visit: Yes   Status: Acute   


Plan: 


Patient will continue with physical therapy to assess for inpatient rehab.  

Patient told to limit IV pain medication.


Qualifiers: 


   Encounter type: initial encounter   Pelvic bone location: multiple parts   

Fracture type: closed   Fracture alignment: without disruption of pelvic ring   

Qualified Code(s): S32.82XA - Multiple fractures of pelvis without disruption 

of pelvic ring, initial encounter for closed fracture   





(2) Risk for falls


Current Visit: Yes   Status: Acute   


Plan: 


Patient will be a good candidate for inpatient rehab.  Will continue to have 

patient work with physical therapy.








(3) Anxiety


Current Visit: Yes   Status: Chronic   


Plan: 


Continue with medication as needed.








(4) Atrial fibrillation


Current Visit: No   Status: Chronic   


Plan: 


Continue with her medication


Qualifiers: 


   Atrial fibrillation type: chronic 





(5) Chronic anticoagulation


Current Visit: No   Status: Chronic   


Plan: 


Continue with medication








(6) Chronic pain syndrome


Onset Date: 04/09/18   Current Visit: No   Status: Chronic   


Plan: 


Patient is seen by pain management as an outpatient.  Pain management consulted 

to evaluate her pain pump.  Will need to limit IV pain medication.








(7) Hyperlipidemia


Onset Date: 05/21/18   Current Visit: Yes   Status: Chronic   


Plan: 


Continue with medication


Qualifiers: 


 





(8) Hypertension


Current Visit: No   Status: Chronic   


Plan: 


Continue with medication


Qualifiers: 


   Hypertension type: essential hypertension 





(9) Hypothyroidism


Onset Date: 05/21/18   Current Visit: Yes   Status: Chronic   


Plan: 


Continue medication


Qualifiers: 


   Hypothyroidism type: unspecified 





(10) Vascular dementia


Current Visit: No   Status: Chronic   


Plan: 


Overall stable.  Will monitor closely.  Patient appropriate.


Qualifiers: 


   Dementia behavioral disturbance: without behavioral disturbance 





(11) Obstructive sleep apnea


Current Visit: No   Status: Suspected   


Plan: 


This can be further evaluated as an outpatient.








(12) Hypokalemia


Current Visit: Yes   Status: Acute   


Plan: 


Will monitor and replace appropriately.  Replacement protocol in place








(13) Hypomagnesemia


Current Visit: Yes   Status: Acute   


Plan: 


Continue with replacement protocol








(14) Hyponatremia


Current Visit: No   Status: Acute   


Plan: 


Encourage oral intake.  Will start IV fluids.





Discharge Plan: Other (Inpatient rehab)


Plan to discharge in: 24 Hours


Time Spent Managing Pts Care (In Minutes): 55

## 2018-05-23 LAB
BUN BLD-MCNC: 9 MG/DL (ref 6–20)
GLUCOSE SERPLBLD-MCNC: 107 MG/DL (ref 65–120)
HCT VFR BLD CALC: 38.4 % (ref 36–45)
LYMPHOCYTES # SPEC AUTO: 2.1 K/UL (ref 0.7–4.9)
MAGNESIUM SERPL-MCNC: 2 MG/DL (ref 1.8–2.5)
MCH RBC QN AUTO: 28.4 PG (ref 27–35)
MCV RBC: 86.8 FL (ref 80–100)
PMV BLD: 7.9 FL (ref 7.6–11.3)
POTASSIUM SERPL-SCNC: 3.8 MEQ/L (ref 3.6–5)
RBC # BLD: 4.42 M/UL (ref 3.86–4.86)

## 2018-05-23 RX ADMIN — AMIODARONE HYDROCHLORIDE SCH MG: 200 TABLET ORAL at 08:25

## 2018-05-23 RX ADMIN — HYDROCODONE BITARTRATE AND ACETAMINOPHEN PRN TAB: 7.5; 325 TABLET ORAL at 20:22

## 2018-05-23 RX ADMIN — Medication SCH ML: at 20:23

## 2018-05-23 RX ADMIN — TRAMADOL HYDROCHLORIDE PRN MG: 50 TABLET, COATED ORAL at 22:11

## 2018-05-23 RX ADMIN — LEVOTHYROXINE SODIUM SCH MG: 25 TABLET ORAL at 05:12

## 2018-05-23 RX ADMIN — ALPRAZOLAM SCH MG: 0.5 TABLET ORAL at 08:25

## 2018-05-23 RX ADMIN — PANTOPRAZOLE SODIUM SCH MG: 40 TABLET, DELAYED RELEASE ORAL at 08:25

## 2018-05-23 RX ADMIN — SODIUM CHLORIDE SCH MLS: 0.9 INJECTION, SOLUTION INTRAVENOUS at 11:53

## 2018-05-23 RX ADMIN — HYDROCODONE BITARTRATE AND ACETAMINOPHEN PRN TAB: 7.5; 325 TABLET ORAL at 14:29

## 2018-05-23 RX ADMIN — APIXABAN SCH MG: 5 TABLET, FILM COATED ORAL at 08:25

## 2018-05-23 RX ADMIN — ATORVASTATIN CALCIUM SCH MG: 40 TABLET, FILM COATED ORAL at 20:23

## 2018-05-23 RX ADMIN — Medication SCH ML: at 08:26

## 2018-05-23 RX ADMIN — TRAMADOL HYDROCHLORIDE PRN MG: 50 TABLET, COATED ORAL at 12:34

## 2018-05-23 RX ADMIN — APIXABAN SCH MG: 5 TABLET, FILM COATED ORAL at 20:22

## 2018-05-23 RX ADMIN — ACETAMINOPHEN PRN MG: 500 TABLET, FILM COATED ORAL at 01:25

## 2018-05-23 RX ADMIN — SODIUM CHLORIDE SCH MLS: 0.9 INJECTION, SOLUTION INTRAVENOUS at 22:11

## 2018-05-23 RX ADMIN — TRAMADOL HYDROCHLORIDE PRN MG: 50 TABLET, COATED ORAL at 05:12

## 2018-05-23 RX ADMIN — HYDROCODONE BITARTRATE AND ACETAMINOPHEN PRN TAB: 7.5; 325 TABLET ORAL at 08:25

## 2018-05-23 NOTE — P.PN
Subjective


Date of Service: 05/23/18


Subjective: Improving (Patient did work with physical therapy twice yesterday.  

She is trying to walk and increase ambulation.)





Physical Examination





- Vital Signs


Temperature: 97.3 F


Blood Pressure: 146/70


Pulse: 92


Respirations: 20


Pulse Ox (%): 94





- Physical Exam


General: Alert, In no apparent distress, Oriented x3, Cooperative


HEENT: Atraumatic


Neck: Supple


Respiratory: Clear to auscultation bilaterally, Normal air movement


Cardiovascular: Normal pulses, Regular rate/rhythm


Gastrointestinal: Normal bowel sounds, Soft and benign, Non-distended, No masses

, No rebound, No guarding


Musculoskeletal: No erythema, No tenderness, No warmth


Integumentary: No erythema, No warmth, No cyanosis, Other (Slight pain to the 

pelvic region.)


Neurological: Normal speech, Normal strength at 5/5 x4 extr, Normal tone, 

Normal affect





- Studies


Medications List Reviewed: Yes





Assessment & Plan





- Problems (Diagnosis)


(1) Pelvic fracture


Onset Date: 05/21/18   Current Visit: Yes   Status: Acute   


Plan: 


Patient did work with physical therapy yesterday.  Patient desires to increase 

walking and ambulation.  Continue with physical therapy today.  Await inpatient 

rehab recommendation.  Other option would be for skilled placement.  Will 

discuss with .  I will add hydrocodone for pain control.


Qualifiers: 


   Encounter type: initial encounter   Pelvic bone location: multiple parts   

Fracture type: closed   Fracture alignment: without disruption of pelvic ring   

Qualified Code(s): S32.82XA - Multiple fractures of pelvis without disruption 

of pelvic ring, initial encounter for closed fracture   





(2) Risk for falls


Current Visit: Yes   Status: Acute   


Plan: 


Patient will be a good candidate for inpatient rehab.  Continue to work with 

physical therapy.








(3) Anxiety


Current Visit: Yes   Status: Chronic   


Plan: 


Continue with medication as needed.








(4) Atrial fibrillation


Current Visit: No   Status: Chronic   


Plan: 


Continue with her medication


Qualifiers: 


   Atrial fibrillation type: chronic 





(5) Chronic anticoagulation


Current Visit: No   Status: Chronic   


Plan: 


Continue with medication








(6) Chronic pain syndrome


Onset Date: 04/09/18   Current Visit: No   Status: Chronic   


Plan: 


Patient has pain pump in place.  Will add hydrocodone for breakthrough pain.








(7) Hyperlipidemia


Onset Date: 05/21/18   Current Visit: Yes   Status: Chronic   


Plan: 


Continue with medication


Qualifiers: 


 





(8) Hypertension


Current Visit: No   Status: Chronic   


Plan: 


Continue with medication


Qualifiers: 


   Hypertension type: essential hypertension 





(9) Hypothyroidism


Onset Date: 05/21/18   Current Visit: Yes   Status: Chronic   


Plan: 


Continue medication


Qualifiers: 


   Hypothyroidism type: unspecified 





(10) Vascular dementia


Current Visit: No   Status: Chronic   


Plan: 


Overall stable.  Will monitor closely.  Patient appropriate.


Qualifiers: 


   Dementia behavioral disturbance: without behavioral disturbance 





(11) Obstructive sleep apnea


Current Visit: No   Status: Suspected   


Plan: 


This can be further evaluated as an outpatient.








(12) Hypokalemia


Current Visit: Yes   Status: Acute   


Plan: 


Will monitor and replace appropriately.  Replacement protocol in place








(13) Hypomagnesemia


Current Visit: Yes   Status: Acute   


Plan: 


Continue with replacement protocol








(14) Hyponatremia


Current Visit: No   Status: Acute   


Plan: 


Encourage oral intake.  Continue with IV fluids.





Discharge Plan: Other (Inpatient rehab)


Plan to discharge in: 24 Hours


Time Spent Managing Pts Care (In Minutes): 55

## 2018-05-24 LAB
BUN BLD-MCNC: 7 MG/DL (ref 6–20)
GLUCOSE SERPLBLD-MCNC: 116 MG/DL (ref 65–120)
HCT VFR BLD CALC: 37.8 % (ref 36–45)
LYMPHOCYTES # SPEC AUTO: 2.8 K/UL (ref 0.7–4.9)
MAGNESIUM SERPL-MCNC: 1.9 MG/DL (ref 1.8–2.5)
MCH RBC QN AUTO: 28.9 PG (ref 27–35)
MCV RBC: 86.7 FL (ref 80–100)
PMV BLD: 7.1 FL (ref 7.6–11.3)
POTASSIUM SERPL-SCNC: 5.2 MEQ/L (ref 3.6–5)
RBC # BLD: 4.36 M/UL (ref 3.86–4.86)

## 2018-05-24 RX ADMIN — HYDROCODONE BITARTRATE AND ACETAMINOPHEN PRN TAB: 7.5; 325 TABLET ORAL at 20:50

## 2018-05-24 RX ADMIN — HYDROCODONE BITARTRATE AND ACETAMINOPHEN PRN TAB: 7.5; 325 TABLET ORAL at 08:31

## 2018-05-24 RX ADMIN — LEVOTHYROXINE SODIUM SCH MG: 25 TABLET ORAL at 05:22

## 2018-05-24 RX ADMIN — TRAMADOL HYDROCHLORIDE PRN MG: 50 TABLET, COATED ORAL at 18:17

## 2018-05-24 RX ADMIN — TRAMADOL HYDROCHLORIDE PRN MG: 50 TABLET, COATED ORAL at 11:40

## 2018-05-24 RX ADMIN — APIXABAN SCH MG: 5 TABLET, FILM COATED ORAL at 20:50

## 2018-05-24 RX ADMIN — HYDROCODONE BITARTRATE AND ACETAMINOPHEN PRN TAB: 7.5; 325 TABLET ORAL at 02:29

## 2018-05-24 RX ADMIN — ALPRAZOLAM SCH MG: 0.5 TABLET ORAL at 08:31

## 2018-05-24 RX ADMIN — APIXABAN SCH MG: 5 TABLET, FILM COATED ORAL at 08:31

## 2018-05-24 RX ADMIN — HYDROCODONE BITARTRATE AND ACETAMINOPHEN PRN TAB: 7.5; 325 TABLET ORAL at 14:39

## 2018-05-24 RX ADMIN — Medication SCH ML: at 20:50

## 2018-05-24 RX ADMIN — Medication SCH ML: at 08:31

## 2018-05-24 RX ADMIN — ATORVASTATIN CALCIUM SCH MG: 40 TABLET, FILM COATED ORAL at 20:50

## 2018-05-24 RX ADMIN — PANTOPRAZOLE SODIUM SCH MG: 40 TABLET, DELAYED RELEASE ORAL at 08:31

## 2018-05-24 RX ADMIN — AMIODARONE HYDROCHLORIDE SCH MG: 200 TABLET ORAL at 08:31

## 2018-05-24 NOTE — P.PN
Subjective


Date of Service: 05/24/18


Primary Care Provider: Dr. Mendez


Chief Complaint: Fall


Subjective: Doing well





Physical Examination





- Vital Signs


Temperature: 97.6 F


Blood Pressure: 183/84


Pulse: 84


Respirations: 19


Pulse Ox (%): 91





- Physical Exam


General: Alert, In no apparent distress, Oriented x3, Cooperative


HEENT: Atraumatic


Neck: Supple


Respiratory: Clear to auscultation bilaterally, Normal air movement


Cardiovascular: Normal pulses, Regular rate/rhythm


Gastrointestinal: Normal bowel sounds, Soft and benign, Non-distended, No 

tenderness, No masses, No rebound, No guarding


Musculoskeletal: No erythema, No tenderness, No warmth


Integumentary: No erythema, No warmth, No cyanosis


Neurological: Normal speech, Normal strength at 5/5 x4 extr, Normal tone, 

Normal affect


Lymphatics: No axilla or inguinal lymphadenopathy





- Studies


Medications List Reviewed: Yes





Assessment & Plan





- Problems (Diagnosis)


(1) Pelvic fracture


Onset Date: 05/21/18   Current Visit: Yes   Status: Acute   


Plan: 


Patient continue to work with physical therapy.  Patient desires to increase 

walking and ambulation.  Awaiting skilled placement acceptance.  Family has 

agreed to skilled placement.  Will continue with pain medication as needed. 


Qualifiers: 


   Encounter type: initial encounter   Pelvic bone location: multiple parts   

Fracture type: closed   Fracture alignment: without disruption of pelvic ring   

Qualified Code(s): S32.82XA - Multiple fractures of pelvis without disruption 

of pelvic ring, initial encounter for closed fracture   





(2) Risk for falls


Current Visit: Yes   Status: Acute   


Plan: 


Await skilled placement approval.  Continue to work with physical therapy.  

Fall precautions in place.








(3) Anxiety


Current Visit: Yes   Status: Chronic   


Plan: 


Continue with medication as needed.








(4) Atrial fibrillation


Current Visit: No   Status: Chronic   


Plan: 


Continue with her medication


Qualifiers: 


   Atrial fibrillation type: chronic 





(5) Chronic anticoagulation


Current Visit: No   Status: Chronic   


Plan: 


Continue with medication








(6) Chronic pain syndrome


Onset Date: 04/09/18   Current Visit: No   Status: Chronic   


Plan: 


Patient has pain pump in place.  Pain medication for breakthrough pain available








(7) Hyperlipidemia


Onset Date: 05/21/18   Current Visit: Yes   Status: Chronic   


Plan: 


Continue with medication


Qualifiers: 


 





(8) Hypertension


Current Visit: No   Status: Chronic   


Plan: 


Continue with medication


Qualifiers: 


   Hypertension type: essential hypertension 





(9) Hypothyroidism


Onset Date: 05/21/18   Current Visit: Yes   Status: Chronic   


Plan: 


Continue medication


Qualifiers: 


   Hypothyroidism type: unspecified 





(10) Vascular dementia


Current Visit: No   Status: Chronic   


Plan: 


Overall stable.  Will monitor closely.  Patient appropriate.


Qualifiers: 


   Dementia behavioral disturbance: without behavioral disturbance 





(11) Obstructive sleep apnea


Current Visit: No   Status: Suspected   


Plan: 


This can be further evaluated as an outpatient.








(12) Hypomagnesemia


Current Visit: Yes   Status: Acute   


Plan: 


Continue with replacement protocol








(13) Hyponatremia


Current Visit: No   Status: Acute   


Plan: 


Encourage oral intake.  Improved.  Will discontinue IV fluids.








(14) Hyperkalemia


Current Visit: Yes   Status: Acute   


Plan: 


Will recheck potassium.  If elevated will provide Kayexalate.





Discharge Plan: Other (Skilled placement)


Plan to discharge in: 24 Hours


Time Spent Managing Pts Care (In Minutes): 55

## 2018-05-24 NOTE — P.DS
Admission Date: 05/19/18


Discharge Date: 05/24/18


Primary Care Provider: Dr. Mendez


Disposition: TRANSFER TO SNF - MEDICAL


Discharge Condition: GOOD


Reason for Admission: Fall


Procedures: 





CT scan:


IMPRESSION:  Right-sided pelvic fracture. Fractures are present at the superior 

and inferior pubic rami junction with the pubic symphysis. Right sacral ala 

fracture is also present. 


Large amount of stool filling but not dilating the entirety of the colon. 

Active colon process is not seen. 


No hydronephrosis or acute  finding. 


Isodense masses and pyelonephritis are not excluded on stone protocol technique





- Problems


(1) Pelvic fracture


Onset Date: 05/21/18   Current Visit: Yes   Status: Acute   


Qualifiers: 


   Encounter type: initial encounter   Pelvic bone location: multiple parts   

Fracture type: closed   Fracture alignment: without disruption of pelvic ring   

Qualified Code(s): S32.82XA - Multiple fractures of pelvis without disruption 

of pelvic ring, initial encounter for closed fracture   





(2) Risk for falls


Current Visit: Yes   Status: Acute   





(3) Anxiety


Current Visit: Yes   Status: Chronic   





(4) Atrial fibrillation


Current Visit: No   Status: Chronic   


Qualifiers: 


   Atrial fibrillation type: chronic 





(5) Chronic anticoagulation


Current Visit: No   Status: Chronic   





(6) Chronic pain syndrome


Onset Date: 04/09/18   Current Visit: No   Status: Chronic   





(7) Hyperlipidemia


Onset Date: 05/21/18   Current Visit: Yes   Status: Chronic   


Qualifiers: 


 





(8) Hypertension


Current Visit: No   Status: Chronic   


Qualifiers: 


   Hypertension type: essential hypertension 





(9) Hypothyroidism


Onset Date: 05/21/18   Current Visit: Yes   Status: Chronic   


Qualifiers: 


   Hypothyroidism type: unspecified 





(10) Vascular dementia


Current Visit: No   Status: Chronic   


Qualifiers: 


   Dementia behavioral disturbance: without behavioral disturbance 





(11) Obstructive sleep apnea


Current Visit: No   Status: Suspected   





(12) Hypomagnesemia


Current Visit: Yes   Status: Acute   





(13) Hyponatremia


Current Visit: No   Status: Acute   





(14) Hyperkalemia


Current Visit: Yes   Status: Acute   


Brief History of Present Illness: 





73-year-old HF with past medical history of chronic back pain on fentanyl pump, 

dyslipidemia, anxiety, GERD, hypothyroidism, atrial fibrillation on Eliquis, 

and COPD.  Patient came in with right flank pain. Patient was recently seen by 

Orthopedic surgery as an outpatient.  She was diagnose with sciatica.  She was 

given a steroid treatment.  The patient also sees pain management.  Patient has 

a fentanyl pump in place.  Patient had worsening symptoms of the right flank 

pain and hip pain. It was worse with walking at that was evacuated.  She is 

found to have a pelvic fracture.  The patient was admitted for further 

evaluation.  


Hospital Course: 








Patient found to have right-sided pelvic fracture on CT scan.  Fractures were 

also present to the superior and inferior pubic rami junction with the pubic 

symphysis.  A right sacral ala fracture was also present.  The patient was 

evaluated by physical therapy.  Patient desired to go to a inpatient rehab 

facility.  Unfortunately the patient slowly improved.  A discussion about 

skilled placement was discussed.  Patient and family agreed.  At discharge 

patient will go to a skilled facility continue her physical therapy and 

ambulation.  Patient has pain pump in place.  Patient is seen by pain 

management.  This can be filled at her scheduled time with pain management.  

Further monitoring of pain can be done by pain management. Tramadol 50 mg 1 

pill 3 times a day as needed for pain will also be provided for breakthrough 

pain. Recommendation is the patient follow up with orthopedics as an outpatient 

to further monitor and address.  





Patient has atrial fibrillation on chronic anti coagulation therapy.  This 

remained stable during her stay.  She will continue with amiodarone 200 mg 1 

pill daily and Eliquis 5 mg 1 pill twice daily.





Patient has hyperlipidemia.  Patient continue with Lipitor 40 mg 1 pill once 

daily.





Patient has anxiety.  Patient may continue with Xanax 0.5 mg daily as needed.





Patient has GERD.  Patient continue with Protonix 40 mg 1 pill once daily.





Patient has hypothyroidism.  She will continue with Levoxyl 0.025 mg daily.





Patient has COPD.  At discharge patient will continue with Symbicort 2 puffs 

twice daily and Pro air 2 puffs 3 times a day as needed for shortness of breath.


Vital Signs/Physical Exam: 














Temp Pulse Resp BP Pulse Ox


 


 98.0 F   93 H  18   113/57 L  92 


 


 05/24/18 12:00  05/24/18 12:00  05/24/18 12:00  05/24/18 12:00 05/24/18 12:00








General: Alert, In no apparent distress, Oriented x3, Cooperative


HEENT: Atraumatic


Neck: Supple


Respiratory: Clear to auscultation bilaterally, Normal air movement


Cardiovascular: Normal pulses, Regular rate/rhythm


Gastrointestinal: Normal bowel sounds, No tenderness, No rebound, No guarding


Musculoskeletal: No erythema, No tenderness, No warmth


Neurological: Normal speech, Normal strength at 5/5 x4 extr, Normal tone, 

Normal affect


Laboratory Data at Discharge: 














WBC  10.7 K/uL (4.3-10.9)  D 05/24/18  04:46    


 


Hgb  12.6 g/dL (12.0-15.0)   05/24/18  04:46    


 


Hct  37.8 % (36.0-45.0)   05/24/18  04:46    


 


Plt Count  313 K/uL (152-406)   05/24/18  04:46    


 


Sodium  131 mEq/L (135-145)  L  05/24/18  04:46    


 


Potassium  4.2 mEq/L (3.6-5.0)   05/24/18  09:53    


 


BUN  7 mg/dL (6-20)   05/24/18  04:46    


 


Creatinine  0.46 mg/dL (0.44-1.00)   05/24/18  04:46    


 


Glucose  116 mg/dL ()   05/24/18  04:46    


 


Magnesium  1.9 mg/dL (1.8-2.5)   05/24/18  04:46    


 


Total Bilirubin  0.8 mg/dL (0.3-1.2)   05/22/18  05:36    


 


AST  22 IU/L (10-42)   05/22/18  05:36    


 


ALT  16 IU/L (10-60)   05/22/18  05:36    


 


Alkaline Phosphatase  100 IU/L ()   05/22/18  05:36    








Home Medications: 








Alprazolam [Xanax*] 0.5 mg PO DAILY 04/08/18 


Amiodarone HCl [Cordarone*] 200 mg PO DAILY 04/08/18 


Apixaban [Eliquis *] 5 mg PO BID 04/08/18 


Atorvastatin Calcium [Lipitor] 40 mg PO BEDTIME 04/08/18 


Cholecalciferol (Vitamin D3) [Vitamin D3] 2,000 unit PO BID 04/08/18 


Fluticasone [Flonase 50MCG Nasal Spray*] 2 sprays NS DAILY 04/08/18 


Levothyroxine Sodium [Unithroid] 25 mcg PO DAILY 04/08/18 


Pantoprazole [Protonix Tab*] 40 mg PO DAILYAC #30 tab 04/13/18 


Albuterol Sulfate [Proair Hfa] 8.5 gm IH TID PRN #1 hfa.[email or web address] 05/24/18 


Budesonide/Formoterol Fumarate [Symbicort 160-4.5 Mcg Inhaler] 2 puff IH TID 

PRN #1 hfa.aer.ad 05/24/18 


traMADol HCL [Ultram*] 50 mg PO TID PRN #20 tab 05/24/18 





New Medications: 


Albuterol Sulfate [Proair Hfa] 8.5 gm IH TID PRN #1 hfa.aer.ad


 PRN Reason: Shortness Of Breath


Budesonide/Formoterol Fumarate [Symbicort 160-4.5 Mcg Inhaler] 2 puff IH TID 

PRN #1 hfa.aer.ad


 PRN Reason: Shortness Of Breath


traMADol HCL [Ultram*] 50 mg PO TID PRN #20 tab


 PRN Reason: Pain


Patient Discharge Instructions: 1.  Patient be transferred to skilled facility 

to continue her care.  2.  Patient presented with pelvic fractures.  Patient 

will continue with physical therapy at skilled facility.  Patient has chronic 

pain. Patient seen by pain management as an outpatient.  Patient will follow up 

with pain management to fill pain pump as scheduled.  Tramadol 50 mg 1 pill 3 

times a day as needed for pain. Recommendations for the patient to follow up 

with orthopedics as an outpatient to further monitor.  3. Patient has atrial 

fibrillation on chronic anti coagulation therapy.  This remained stable during 

her stay.  She will continue with amiodarone 200 mg 1 pill daily and Eliquis 5 

mg 1 pill twice daily.  4.  Patient has hyperlipidemia.  Patient continue with 

Lipitor 40 mg 1 pill once daily.  5.  Patient has anxiety.  Patient may 

continue with Xanax 0.5 mg daily as needed.  6.  Patient has GERD.  Patient 

continue with Protonix 40 mg 1 pill once daily.  7.  Patient has 

hypothyroidism.  She will continue with Levoxyl 0.025 mg daily.  8.  Patient 

has COPD.  Patient will continue with Symbicort 2 puffs twice daily and Pro air 

2 puffs 3 times a day as needed for shortness of breath.


Diet: AHA


Activity: Fall precautions


Time spent managing pt's care (in minutes): 55

## 2018-05-25 VITALS — TEMPERATURE: 97.4 F | SYSTOLIC BLOOD PRESSURE: 128 MMHG | DIASTOLIC BLOOD PRESSURE: 68 MMHG

## 2018-05-25 VITALS — OXYGEN SATURATION: 91 %

## 2018-05-25 LAB
BUN BLD-MCNC: 8 MG/DL (ref 6–20)
GLUCOSE SERPLBLD-MCNC: 113 MG/DL (ref 65–120)
HCT VFR BLD CALC: 37.9 % (ref 36–45)
LYMPHOCYTES # SPEC AUTO: 2.5 K/UL (ref 0.7–4.9)
MAGNESIUM SERPL-MCNC: 1.8 MG/DL (ref 1.8–2.5)
MCH RBC QN AUTO: 28.9 PG (ref 27–35)
MCV RBC: 86.5 FL (ref 80–100)
PMV BLD: 6.8 FL (ref 7.6–11.3)
POTASSIUM SERPL-SCNC: 4 MEQ/L (ref 3.6–5)
RBC # BLD: 4.38 M/UL (ref 3.86–4.86)

## 2018-05-25 RX ADMIN — Medication SCH ML: at 09:46

## 2018-05-25 RX ADMIN — ACETAMINOPHEN PRN MG: 500 TABLET, FILM COATED ORAL at 05:20

## 2018-05-25 RX ADMIN — TRAMADOL HYDROCHLORIDE PRN MG: 50 TABLET, COATED ORAL at 02:40

## 2018-05-25 RX ADMIN — APIXABAN SCH MG: 5 TABLET, FILM COATED ORAL at 09:45

## 2018-05-25 RX ADMIN — ALPRAZOLAM SCH MG: 0.5 TABLET ORAL at 09:46

## 2018-05-25 RX ADMIN — HYDROCODONE BITARTRATE AND ACETAMINOPHEN PRN TAB: 7.5; 325 TABLET ORAL at 11:00

## 2018-05-25 RX ADMIN — PANTOPRAZOLE SODIUM SCH MG: 40 TABLET, DELAYED RELEASE ORAL at 05:20

## 2018-05-25 RX ADMIN — LEVOTHYROXINE SODIUM SCH MG: 25 TABLET ORAL at 05:20

## 2018-05-25 RX ADMIN — AMIODARONE HYDROCHLORIDE SCH MG: 200 TABLET ORAL at 09:45

## 2020-09-21 LAB
BUN BLD-MCNC: 10 MG/DL (ref 7–18)
GLUCOSE SERPLBLD-MCNC: 98 MG/DL (ref 74–106)
HCT VFR BLD CALC: 40.1 % (ref 36–45)
LYMPHOCYTES # SPEC AUTO: 2.6 K/UL (ref 0.7–4.9)
PMV BLD: 7.9 FL (ref 7.6–11.3)
POTASSIUM SERPL-SCNC: 4.5 MMOL/L (ref 3.5–5.1)
RBC # BLD: 4.59 M/UL (ref 3.86–4.86)

## 2020-09-22 NOTE — RAD REPORT
EXAM DESCRIPTION:  RAD - Chest Pa And Lat (2 Views) - 9/21/2020 3:52 pm

 

CLINICAL HISTORY:  preop, pending left upper extremity soft tissue mass removal

 

COMPARISON:  April 2018

 

TECHNIQUE:  Frontal and lateral views of the chest were obtained.

 

FINDINGS:  The lungs are clear of a mass or consolidation. Patient has a chronic interstitial pattern
 with lung base scarring or atelectasis. Active interstitial process is not suspected. Mediastinal an
d hilar regions within normal limits and stable. PICC line has been removed since comparison.

 

Heart size is normal and central vasculature is within normal limits.  No pleural effusion or pneumot
horax seen.

 

Bones are osteopenic. No acute bony finding noted.  No aortic abnormality.

 

IMPRESSION:  No acute cardiopulmonary process.

 

Chronic interstitial lung changes are present similar to 2018.

## 2020-09-23 ENCOUNTER — HOSPITAL ENCOUNTER (OUTPATIENT)
Dept: HOSPITAL 97 - OR | Age: 75
Discharge: HOME | End: 2020-09-23
Attending: SURGERY
Payer: COMMERCIAL

## 2020-09-23 VITALS — SYSTOLIC BLOOD PRESSURE: 135 MMHG | DIASTOLIC BLOOD PRESSURE: 76 MMHG | TEMPERATURE: 96.2 F | OXYGEN SATURATION: 94 %

## 2020-09-23 DIAGNOSIS — I10: ICD-10-CM

## 2020-09-23 DIAGNOSIS — I48.91: ICD-10-CM

## 2020-09-23 DIAGNOSIS — Z88.2: ICD-10-CM

## 2020-09-23 DIAGNOSIS — J44.9: ICD-10-CM

## 2020-09-23 DIAGNOSIS — E03.9: ICD-10-CM

## 2020-09-23 DIAGNOSIS — Z20.828: ICD-10-CM

## 2020-09-23 DIAGNOSIS — L94.2: Primary | ICD-10-CM

## 2020-09-23 PROCEDURE — 71046 X-RAY EXAM CHEST 2 VIEWS: CPT

## 2020-09-23 PROCEDURE — 80048 BASIC METABOLIC PNL TOTAL CA: CPT

## 2020-09-23 PROCEDURE — 36415 COLL VENOUS BLD VENIPUNCTURE: CPT

## 2020-09-23 PROCEDURE — 88311 DECALCIFY TISSUE: CPT

## 2020-09-23 PROCEDURE — 0JBM0ZZ EXCISION OF LEFT UPPER LEG SUBCUTANEOUS TISSUE AND FASCIA, OPEN APPROACH: ICD-10-PCS

## 2020-09-23 PROCEDURE — 85025 COMPLETE CBC W/AUTO DIFF WBC: CPT

## 2020-09-23 PROCEDURE — 88305 TISSUE EXAM BY PATHOLOGIST: CPT

## 2020-09-23 RX ADMIN — CEFAZOLIN ONE MLS: 1 INJECTION, POWDER, FOR SOLUTION INTRAMUSCULAR; INTRAVENOUS; PARENTERAL at 08:57

## 2020-09-23 RX ADMIN — SODIUM CHLORIDE, SODIUM LACTATE, POTASSIUM CHLORIDE, AND CALCIUM CHLORIDE ONE MLS: .6; .31; .03; .02 INJECTION, SOLUTION INTRAVENOUS at 09:01

## 2020-09-23 RX ADMIN — SODIUM CHLORIDE, SODIUM LACTATE, POTASSIUM CHLORIDE, AND CALCIUM CHLORIDE ONE MLS: .6; .31; .03; .02 INJECTION, SOLUTION INTRAVENOUS at 08:57

## 2020-09-23 RX ADMIN — CEFAZOLIN ONE MLS: 1 INJECTION, POWDER, FOR SOLUTION INTRAMUSCULAR; INTRAVENOUS; PARENTERAL at 09:20

## 2020-09-23 NOTE — XMS REPORT
Continuity of Care Document

                          Created on:2020



Patient:LIANE CASILLAS

Sex:Female

:1945

External Reference #:[id number]





Demographics







                          Address                   [address]



                                                    [place] [address]

 

                          Home Phone                [phone]

 

                          Email Address             DECLINED

 

                          Preferred Language        Unknown

 

                          Marital Status            Unknown

 

                          [employer] Affiliation     Unknown

 

                          Race                      Unknown

 

                          Additional Race(s)        Unavailable

 

                          Ethnic Group              Unknown









Author







                          Organization              [facility]

t

 

                          Address                   [address] [place] [address] [place] [id number]

 

                          Phone                     [phone]









Care Team Providers







                    Name                Role                Phone

 

                    Unavailable         Unavailable         Unavailable









Problems

This patient has no known problems.



Allergies, Adverse Reactions, Alerts

This patient has no known allergies or adverse reactions.



Medications

This patient has no known medications.



Procedures

This patient has no known procedures.



Results

This patient has no known results.

## 2020-09-23 NOTE — P.BOP
Preoperative diagnosis: left upper posterior leg subcutaneus tender mass


Postoperative diagnosis: same


Primary procedure: Excisional bx of left upper posterior leg subcutaneus tender 

mass


Assistant: Shantal Dominguez)


Estimated blood loss: <10cc


Specimen: mass


Findings: hard irregular solid mass


Anesthesia: General


Complications: None


Transferred to: Recovery Room


Condition: Good

## 2020-09-23 NOTE — OP
Date of Procedure:  09/23/2020



Surgeon:  Alberto Rosario MD



Assistant:  JUN Murillo.



Diagnosis:  Left posterior upper leg subcutaneous mass.



Postoperative Diagnosis:  Left upper posterior leg subcutaneous mass.



Procedure:  Excisional biopsy with layered closure of the left upper posterior leg subcutaneous tende
r mass.



Estimated Blood Loss:  Less than 10 cc.



Indications:  This is a patient who comes to us with a left posterior leg tender mass.  It was red, d
iscomfort, it was trying to get infected.  Today, the mass is still present, erythema looks better.  
Etiology of that is unknown.  She does not recall any previous trauma.  She can be barely sit on it. 
 So benefits and risks of excision were fully explained which include but not limited to infection, b
leeding, damage to adjacent structures, anesthesia complication, nonhealing wound, MI, and even death
.  She also understands this may not relieve any symptoms.  She might need more than one surgical int
ervention.  The area of concern was marked by me and the patient in the holding room.



Description Of Procedure:  The patient was brought to the operating room and placed in supine positio
n.  Anesthesia was done without complication.  Left leg was prepped and draped in a sterile fashion a
fter the patient was placed in lateral decubitus position with proper protection.  Time-out was maddox
d.  The area was previously marked by me and the patient in the holding room, so that area was preppe
d and draped.  We made an incision on the skin and we noticed to go deep in the subcutaneous tissue. 
 We noticed this mass asymmetrical in shape and color, does not seem to be attached to the muscle but
 is deep near the fascia.  The mass was completely excised.  There was no specific shape to it, it wa
s just a mass with some fluctuance seen in the middle of it.  It was hard.  At this point, it had darwin
e area looked like calcifications on it.  The mass was completely excised and then we had to close th
is in layers with chromic in the deep subcutaneous layers, then the subcuticular layer too, and then 
the nylon for the skin.  Before that irrigation and hemostasis were obtained and even cultures.  The 
patient tolerated the procedure well.  The patient was sent to Recovery in stable condition.



Disposition:  Home.



Activity:  As tolerated.  No heavy lifting.



Plan:  Follow up in my office in 1 week.  Call for appointment [phone].  The patient will use Bactrob
an over the area after removal of the dressings and then cover with sterile dressings.



Medications:  Tylenol q.4 hours p.r.n. pain.  Cipro 500 p.o. q.12. 



She was advised to discuss that with Dr. Ortiz if she needs more pain medication than this quantit
y.  She was advised also to be careful with it and do not exceed what we gave her.





PERRY/LISA

DD:  09/23/2020 11:20:41Voice ID:  [id number]

DT:  09/23/2020 12:24:59Report ID:  [id number]

## 2024-12-09 NOTE — RAD REPORT
EXAM DESCRIPTION:  [place]4/8/2018 3:44 am

 

CLINICAL HISTORY:  Shortness of breath

 

COMPARISON:  April 7th

 

FINDINGS:   Endotracheal tube is tip well above the cy. A nasogastric tube is present the stomach
.

 

An area of subsegmental atelectasis is present the left lung base. The remainder of the lungs appear 
clear

 

The heart is borderline enlarged Sirolimus dosing abnormal CT Palliative consulted to discuss goals of care and treatment preferences in the setting of advanced age, frailty. hypertensive urgency hyponatremia stroke code